# Patient Record
Sex: MALE | Race: WHITE | Employment: FULL TIME | URBAN - METROPOLITAN AREA
[De-identification: names, ages, dates, MRNs, and addresses within clinical notes are randomized per-mention and may not be internally consistent; named-entity substitution may affect disease eponyms.]

---

## 2017-02-13 DIAGNOSIS — F32.A MINOR DEPRESSION: ICD-10-CM

## 2017-02-13 DIAGNOSIS — F41.1 GAD (GENERALIZED ANXIETY DISORDER): ICD-10-CM

## 2017-02-13 RX ORDER — CLONAZEPAM 1 MG/1
1 TABLET ORAL 2 TIMES DAILY PRN
Qty: 60 TABLET | Refills: 0 | Status: SHIPPED
Start: 2017-02-13 | End: 2017-03-28

## 2017-02-13 RX ORDER — BUSPIRONE HYDROCHLORIDE 7.5 MG/1
7.5 TABLET ORAL 2 TIMES DAILY
Qty: 60 TABLET | Refills: 0 | Status: SHIPPED | OUTPATIENT
Start: 2017-02-13 | End: 2017-03-28 | Stop reason: DRUGHIGH

## 2017-02-13 NOTE — TELEPHONE ENCOUNTER
From: Jaycee Jon  To: Nelson Rao PA-C  Sent: 2/13/2017 10:29 AM CST  Subject: Medication Renewal Request    Original authorizing provider: KAMILA Wilkinson would like a refill of the following medications:  BusPIRone H

## 2017-03-24 ENCOUNTER — LAB ENCOUNTER (OUTPATIENT)
Dept: LAB | Age: 47
End: 2017-03-24
Attending: PHYSICIAN ASSISTANT
Payer: COMMERCIAL

## 2017-03-24 DIAGNOSIS — Z13.0 SCREENING, ANEMIA, DEFICIENCY, IRON: ICD-10-CM

## 2017-03-24 DIAGNOSIS — Z13.89 SCREENING FOR BLOOD OR PROTEIN IN URINE: ICD-10-CM

## 2017-03-24 DIAGNOSIS — Z13.220 SCREENING FOR LIPID DISORDERS: ICD-10-CM

## 2017-03-24 DIAGNOSIS — F10.11 ALCOHOL ABUSE, IN REMISSION: ICD-10-CM

## 2017-03-24 DIAGNOSIS — R79.89 ELEVATED LFTS: ICD-10-CM

## 2017-03-24 DIAGNOSIS — Z13.228 SCREENING FOR METABOLIC DISORDER: ICD-10-CM

## 2017-03-24 LAB
ALBUMIN SERPL-MCNC: 4 G/DL (ref 3.5–4.8)
ALP LIVER SERPL-CCNC: 75 U/L (ref 45–117)
ALT SERPL-CCNC: 83 U/L (ref 17–63)
AST SERPL-CCNC: 127 U/L (ref 15–41)
BASOPHILS # BLD AUTO: 0.1 X10(3) UL (ref 0–0.1)
BASOPHILS NFR BLD AUTO: 1.5 %
BILIRUB DIRECT SERPL-MCNC: 0.1 MG/DL (ref 0.1–0.5)
BILIRUB SERPL-MCNC: 0.9 MG/DL (ref 0.1–2)
BUN BLD-MCNC: 16 MG/DL (ref 8–20)
CALCIUM BLD-MCNC: 8.9 MG/DL (ref 8.3–10.3)
CHLORIDE: 105 MMOL/L (ref 101–111)
CHOLEST SMN-MCNC: 169 MG/DL (ref ?–200)
CO2: 25 MMOL/L (ref 22–32)
CREAT BLD-MCNC: 0.93 MG/DL (ref 0.7–1.3)
EOSINOPHIL # BLD AUTO: 0.32 X10(3) UL (ref 0–0.3)
EOSINOPHIL NFR BLD AUTO: 4.8 %
ERYTHROCYTE [DISTWIDTH] IN BLOOD BY AUTOMATED COUNT: 13.2 % (ref 11.5–16)
GAMMA GLUTAMYL TRANSFERASE: 121 U/L (ref 15–85)
GLUCOSE BLD-MCNC: 88 MG/DL (ref 70–99)
HCT VFR BLD AUTO: 45.5 % (ref 37–53)
HDLC SERPL-MCNC: 56 MG/DL (ref 45–?)
HDLC SERPL: 3.02 {RATIO} (ref ?–4.97)
HGB BLD-MCNC: 15.9 G/DL (ref 13–17)
IMMATURE GRANULOCYTE COUNT: 0.09 X10(3) UL (ref 0–1)
IMMATURE GRANULOCYTE RATIO %: 1.3 %
LDLC SERPL CALC-MCNC: 98 MG/DL (ref ?–130)
LYMPHOCYTES # BLD AUTO: 1.57 X10(3) UL (ref 0.9–4)
LYMPHOCYTES NFR BLD AUTO: 23.5 %
M PROTEIN MFR SERPL ELPH: 7.1 G/DL (ref 6.1–8.3)
MCH RBC QN AUTO: 31.6 PG (ref 27–33.2)
MCHC RBC AUTO-ENTMCNC: 34.9 G/DL (ref 31–37)
MCV RBC AUTO: 90.5 FL (ref 80–99)
MONOCYTES # BLD AUTO: 0.9 X10(3) UL (ref 0.1–0.6)
MONOCYTES NFR BLD AUTO: 13.5 %
NEUTROPHIL ABS PRELIM: 3.7 X10 (3) UL (ref 1.3–6.7)
NEUTROPHILS # BLD AUTO: 3.7 X10(3) UL (ref 1.3–6.7)
NEUTROPHILS NFR BLD AUTO: 55.4 %
NONHDLC SERPL-MCNC: 113 MG/DL (ref ?–130)
PLATELET # BLD AUTO: 258 10(3)UL (ref 150–450)
POTASSIUM SERPL-SCNC: 4.5 MMOL/L (ref 3.6–5.1)
RBC # BLD AUTO: 5.03 X10(6)UL (ref 4.3–5.7)
RED CELL DISTRIBUTION WIDTH-SD: 43.9 FL (ref 35.1–46.3)
SODIUM SERPL-SCNC: 138 MMOL/L (ref 136–144)
TRIGLYCERIDES: 77 MG/DL (ref ?–150)
VLDL: 15 MG/DL (ref 5–40)
WBC # BLD AUTO: 6.7 X10(3) UL (ref 4–13)

## 2017-03-24 PROCEDURE — 82977 ASSAY OF GGT: CPT

## 2017-03-24 PROCEDURE — 82248 BILIRUBIN DIRECT: CPT

## 2017-03-24 PROCEDURE — 80053 COMPREHEN METABOLIC PANEL: CPT

## 2017-03-24 PROCEDURE — 80061 LIPID PANEL: CPT

## 2017-03-24 PROCEDURE — 85025 COMPLETE CBC W/AUTO DIFF WBC: CPT

## 2017-03-24 PROCEDURE — 36415 COLL VENOUS BLD VENIPUNCTURE: CPT

## 2017-03-28 ENCOUNTER — OFFICE VISIT (OUTPATIENT)
Dept: INTERNAL MEDICINE CLINIC | Facility: CLINIC | Age: 47
End: 2017-03-28

## 2017-03-28 VITALS
WEIGHT: 206 LBS | SYSTOLIC BLOOD PRESSURE: 100 MMHG | HEIGHT: 69.5 IN | OXYGEN SATURATION: 98 % | DIASTOLIC BLOOD PRESSURE: 80 MMHG | TEMPERATURE: 98 F | BODY MASS INDEX: 29.83 KG/M2 | RESPIRATION RATE: 16 BRPM | HEART RATE: 103 BPM

## 2017-03-28 DIAGNOSIS — R79.89 ELEVATED LFTS: Primary | ICD-10-CM

## 2017-03-28 DIAGNOSIS — F41.1 GAD (GENERALIZED ANXIETY DISORDER): ICD-10-CM

## 2017-03-28 DIAGNOSIS — F10.21 ALCOHOL DEPENDENCE IN REMISSION (HCC): ICD-10-CM

## 2017-03-28 PROCEDURE — 99214 OFFICE O/P EST MOD 30 MIN: CPT | Performed by: PHYSICIAN ASSISTANT

## 2017-03-28 RX ORDER — BUSPIRONE HYDROCHLORIDE 15 MG/1
15 TABLET ORAL 2 TIMES DAILY
Qty: 60 TABLET | Refills: 2 | Status: SHIPPED | OUTPATIENT
Start: 2017-03-28 | End: 2017-03-29

## 2017-03-28 RX ORDER — CLONAZEPAM 1 MG/1
1 TABLET ORAL 2 TIMES DAILY PRN
Qty: 60 TABLET | Refills: 0 | Status: SHIPPED | OUTPATIENT
Start: 2017-03-28 | End: 2017-04-24

## 2017-03-28 NOTE — PROGRESS NOTES
Goldie Nuñez is a 42year old male. HPI:   Pt following up on anxiety. Reports 7.5mg bid of buspar did not improve his anxiety. So far has had adverse effects with SSRI, SNRI therapy. Still taking clonazepam bid.      Reports he has completely stopped d clear  NECK: supple,no adenopathy,no bruits  LUNGS: clear to auscultation  CARDIO: RRR without murmur  GI: good BS's,no masses, HSM or tenderness  EXTREMITIES: no cyanosis, clubbing or edema  PSYCH: mood and affect normal ;thought process logical, memory i

## 2017-03-28 NOTE — PATIENT INSTRUCTIONS
Restart buspirone: take 7.5mg twice daily (1/2 tablet) x 3 days, then 15mg in the morning and 7.5mg in the evening x 3 days, then 15mg twice daily.

## 2017-03-29 NOTE — TELEPHONE ENCOUNTER
From: Adriano Henderson  To: Charla Latham PA-C  Sent: 3/28/2017 4:55 PM CDT  Subject: Non-Urgent Medical Question    Blinda Lyndsay- Can you please send the Busiprone Rx to [address] [facility] on [facility]. in [place]?  The GoodRx price is 1/3 of the price compared to [facility]

## 2017-04-04 ENCOUNTER — HOSPITAL ENCOUNTER (OUTPATIENT)
Dept: ULTRASOUND IMAGING | Age: 47
Discharge: HOME | End: 2017-04-04
Attending: PHYSICIAN ASSISTANT
Payer: COMMERCIAL

## 2017-04-04 DIAGNOSIS — R79.89 ELEVATED LFTS: ICD-10-CM

## 2017-04-04 PROCEDURE — 76700 US EXAM ABDOM COMPLETE: CPT

## 2017-04-24 NOTE — TELEPHONE ENCOUNTER
From: Lluvia Corbin  To: Jason Sheffield PA-C  Sent: 4/24/2017 3:08 PM CDT  Subject: Prescription Question    Good afternoon. I was looking to get a refill for Clonozepam 1MG. Can you send the refill prescription to [facility] on [address] in [facility]?

## 2017-04-26 ENCOUNTER — PATIENT MESSAGE (OUTPATIENT)
Dept: INTERNAL MEDICINE CLINIC | Facility: CLINIC | Age: 47
End: 2017-04-26

## 2017-04-26 NOTE — TELEPHONE ENCOUNTER
From: Sacha Samson  To: Wayne Crawley PA-C  Sent: 4/26/2017 8:32 AM CDT  Subject: Prescription Question    Good morning. I put in a request for a refill on Clonazepan but haven't heard back.  Please send to [address] [facility] on [address] in [facility]

## 2017-06-19 NOTE — PATIENT INSTRUCTIONS
Restart cymbalta take 60mg daily. Will plan to increase to 90mg daily in 3 weeks if tolerating well. Start [place] [address] meetings. Alcoholism: Getting Help  Facing a problem with alcohol can be hard.  Once a person decides to get help, it can be found in many plac Addiction    www.niaaa.nih.gov/alcohol-health  · [employer] on [address] ([place]aron [address]   www.ncadd. org  · Alcoholism Anonymous    www.aa.org  · Substance Abuse and [address] ([address]   www.sama.gov

## 2017-06-19 NOTE — PROGRESS NOTES
Marylene Bass is a 42year old male. HPI:   Anxiety: pt states is not well controlled. Stopped buspar because did not feel it was working. Takes clonazepam bid but admits to taking slightly more in the past month due to uncontrolled symptoms.    Wants to tenderness  EXTREMITIES: no cyanosis, clubbing or edema  PSYCH: appears anxious. No inappropriate speech.  Cognition intact and memory intact  NEURO: a/ox3 gait is stable coordination intact    ASSESSMENT AND PLAN:   Andrez (generalized anxiety disorder)  (yadira

## 2017-08-21 DIAGNOSIS — F41.1 GAD (GENERALIZED ANXIETY DISORDER): ICD-10-CM

## 2017-08-21 RX ORDER — DULOXETIN HYDROCHLORIDE 60 MG/1
60 CAPSULE, DELAYED RELEASE ORAL DAILY
Qty: 30 CAPSULE | Refills: 0 | Status: SHIPPED | OUTPATIENT
Start: 2017-08-21 | End: 2017-10-12

## 2017-08-21 RX ORDER — CLONAZEPAM 1 MG/1
1 TABLET ORAL 2 TIMES DAILY PRN
Qty: 60 TABLET | Refills: 0 | Status: SHIPPED
Start: 2017-08-21 | End: 2017-10-03

## 2017-08-21 NOTE — TELEPHONE ENCOUNTER
Patient stated he can't make it to his appointment today, and would like a refill on Cymbalta and Clonazepam 1 mg. He did not say qty- only what Jonnathan Hebert prescribes. He re-scheduled for next week. Please send RX to [facility] on file.

## 2017-10-03 DIAGNOSIS — F41.1 GAD (GENERALIZED ANXIETY DISORDER): ICD-10-CM

## 2017-10-03 RX ORDER — CLONAZEPAM 1 MG/1
1 TABLET ORAL 2 TIMES DAILY PRN
Qty: 60 TABLET | Refills: 0 | Status: SHIPPED
Start: 2017-10-03 | End: 2017-11-01

## 2017-10-10 NOTE — PROGRESS NOTES
Ashleigh Alejandro is a 42year old male. HPI:   Patient reports x 2 weeks he has been going to Alcoholics Anonymous. He had his last drink 10/3 and has been feeling gradually better every day.  He reports feeling significantly improved since quitting drinking developed, well nourished,in no apparent distress  SKIN: no rashes,no suspicious lesions, warm and dry  HEENT: atraumatic, normocephalic,ears and throat are clear  NECK: supple,no adenopathy, no thyromegaly  LUNGS: clear to auscultation b/l no W/R/R  CARDI

## 2017-10-11 NOTE — PATIENT INSTRUCTIONS
Continue current medications and continue with AA meetings. Let us know if you have any concerns including relapse in drinking. Complete blood test when possible, fasting 4 hours beforehand  Increase cymbalta to total of 90mg daily.    Continue clonazepam Alcoholism can lead to a type of distorted thinking known as “alcohol-think.” One of the most common forms of this is denial. This is when the person denies that drinking is a problem.  He or she may deny that any of the problems in his or her life are caus

## 2017-10-12 DIAGNOSIS — F41.1 GAD (GENERALIZED ANXIETY DISORDER): ICD-10-CM

## 2017-10-12 RX ORDER — DULOXETIN HYDROCHLORIDE 60 MG/1
CAPSULE, DELAYED RELEASE ORAL
Qty: 30 CAPSULE | Refills: 2 | Status: SHIPPED | OUTPATIENT
Start: 2017-10-12 | End: 2018-01-02

## 2017-11-01 DIAGNOSIS — F41.1 GAD (GENERALIZED ANXIETY DISORDER): ICD-10-CM

## 2017-11-01 RX ORDER — CLONAZEPAM 1 MG/1
1 TABLET ORAL 2 TIMES DAILY PRN
Qty: 60 TABLET | Refills: 1 | Status: ON HOLD
Start: 2017-11-01 | End: 2017-12-08

## 2017-11-01 NOTE — TELEPHONE ENCOUNTER
From: Lluvia Corbin  Sent: 11/1/2017 11:08 AM CDT  Subject: Medication Renewal Request    Lluvia Corbin would like a refill of the following medications:     ClonazePAM 1 MG Oral Tab Svetlana Pineda MD]    Preferred pharmacy: [id number]anesh [facility]

## 2017-12-06 PROBLEM — F10.939 ALCOHOL WITHDRAWAL SYNDROME WITH COMPLICATION (HCC): Status: ACTIVE | Noted: 2017-12-06

## 2017-12-06 PROBLEM — F10.239 ALCOHOL WITHDRAWAL SYNDROME WITH COMPLICATION (HCC): Status: ACTIVE | Noted: 2017-12-06

## 2017-12-06 PROBLEM — R74.8 ELEVATED LIVER ENZYMES: Status: ACTIVE | Noted: 2017-12-06

## 2017-12-06 NOTE — ED INITIAL ASSESSMENT (HPI)
Pt c/o etoh and alcohol withdrawal symptoms, pt c/o shakes,pt last drink was 6 hours ago, pt sts he normally drinks 2 \"big bottles of wine,\" pt denies SI/HI.

## 2017-12-06 NOTE — ED PROVIDER NOTES
Patient Seen in: [facility] Emergency Department    History   Patient presents with:  Alcohol Intoxication (neurologic)    Stated Complaint: etoh    HPI    This is a 57-year-old male who arrives here requesting alcohol detox.   The patient states he There is no rebound. There is no guarding. EXT: There is good pulses bilaterally. There is no calf tenderness. There is no rash noted. There is no edema his hands are somewhat tremulous. NEURO: Alert and oriented x3.   Muscle strength and sensory shakes are gone down significantly feels comfortable at this present time. The patient is not in any respiratory distress.   The patient's elevated liver functions were noted to be elevated I discussed this case with Dr. the lochia who felt that is all pro

## 2017-12-07 ENCOUNTER — APPOINTMENT (OUTPATIENT)
Dept: ULTRASOUND IMAGING | Facility: HOSPITAL | Age: 47
DRG: 897 | End: 2017-12-07
Attending: HOSPITALIST
Payer: COMMERCIAL

## 2017-12-07 PROCEDURE — 76700 US EXAM ABDOM COMPLETE: CPT | Performed by: HOSPITALIST

## 2017-12-07 NOTE — PAYOR COMM NOTE
VS--------------  ADMISSION REVIEW       12/6       Patient presents with:  Alcohol Intoxication      Stated Complaint: etoh     HPI     This is a 53-year-old male who arrives here requesting alcohol detox.   The patient states he is not suicidal, homicidal normal limits   CBC W/ DIFFERENTIAL - Abnormal; Notable for the following:      HGB 17.6 (*)       Monocyte Absolute 0.76 (*)       All other components within normal limits   CBC WITH DIFFERENTIAL WITH PLATELET     Narrative:      The following orders wer

## 2017-12-07 NOTE — ED NOTES
Information faxed to [address] for bed request for patient.   Instructed to wait 1 hour and with no answer to call [phone]

## 2017-12-07 NOTE — ED NOTES
Bedside report given to Tony Barton along the bedside verbal order to give 1mg of ativan along with zofran and a liter of fluids

## 2017-12-07 NOTE — PROGRESS NOTES
12/07/17 4715   Clinical Encounter Type   Visited With Patient  (Responded to consult.)   Continue Visiting No  ( remains available at pager #[phone] as needed/requested.)   Patient's Supportive Strategies/Resources  assisted patient to telly

## 2017-12-07 NOTE — ED NOTES
I called [facility] patient is out of network.  The family members and his only family his 2 aunts express

## 2017-12-07 NOTE — PROGRESS NOTES
NURSING ADMISSION NOTE      Patient admitted via Cart  Oriented to room. Safety precautions initiated. Bed in low position.   Call light in reach.    -------------------------------------------  Admission navigator completed  Patient is A&Ox4  [facility]

## 2017-12-07 NOTE — CONSULTS
[facility]                       Gastroenterology Consultation-[facility] Gastroenterology    Dhara Ape Patient Status:  Observation    1970 MRN [id number]   [facility] 3NE-A Attending Jaida Arceo MD   Hosp Day # 0 CAMERON Noble injection 2 mg 2 mg Intravenous Q8H PRN   metoprolol Tartrate (LOPRESSOR) tab 25 mg 25 mg Oral BID PRN   Metoclopramide HCl (REGLAN) injection 10 mg 10 mg Intravenous Q8H PRN   [COMPLETED] Thiamine HCl 100 mg in sodium chloride 0.9 % 50 mL IVPB 100 mg Intr The patient has no family history of colon cancer or other gastrointestinal malignancies; No family history of ulcer disease, or inflammatory bowel disease  ROS:  In addition to the pertinent positives described above:             Infectious Disease:  No c tympany to percussion  Ext: No peripheral edema or cyanosis  Skin: Warm and dry; flushed   Psychiatric: Appropriate mood and congruent affect without obvious depression or anxiety  Neuro: + tremors, no asterixis   Labs:   Lab Results  Component Value Date common bile duct. Common bile duct diameter is for mm. PANCREAS:  Normal.  SPLEEN:  Normal.  KIDNEYS:  Lobulated renal contours nonspecific but may relate to scarring. Right kidney measures 10.1 cm. Left kidney measures 11.4 cm.   AORTA/IVC:  Normal.  = molly.   Abner Perez MD

## 2017-12-07 NOTE — ED NOTES
PCT 3 CTU WITH REPORT GIVEN TO AND ACCEPTED BY PRECIOUS MISTRY  PREPARED FOR TX TO 3612 PER CART VIA TRANSORT

## 2017-12-07 NOTE — H&P
ART HOSPITALIST  History and Physical     [facility] Patient Status:  Observation    1970 MRN [id number]   [facility] 3NE-A Attending Jodi Walter, [address] Day # 0 PCP Emelia Espinoza MD     Chief Complaint: alcohol withdrawa Exam:    BP (!) 156/103 (BP Location: Left arm)   Pulse 119   Temp 98.6 °F (37 °C) (Oral)   Resp 14   Ht 5' 10\" (1.778 m)   Wt 190 lb (86.2 kg)   SpO2 96%   BMI 27.26 kg/m²   General: Alert and oriented x 3. HEENT: Normocephalic atraumatic.  Moist mucous

## 2017-12-07 NOTE — PROGRESS NOTES
Patient seen and examined this am .   He feels anxious. No SI/HI. Will cont CIWA protocol. FOR FURTHER DETAILS PLEASE Refere to my colleagues H&P from this morning.      Yola Choudhury MD  [facility]

## 2017-12-07 NOTE — BH LEVEL OF CARE ASSESSMENT
Level of Care Assessment Note               General Questions  Why are you here?: Patient states to detox  Precipitating Events: etoh intake  History of Present Illness: Pt admits to drinking wine 2 large bottles per day.   He states he has been going to [facility] observed  Sleep Pattern: Sleeps all night  Appetite Symptoms: Decreased  Unplanned Weight Loss: Yes (comment)  Unplanned Weight Gain: No  History of Eating Disorder: No  Active Eating Disorder: No                 Current/Previous MH/CD Providers  Hospital Characteristics: Normal rate;Normal rhythm;Normal volume  Concentration: Unimpaired  Memory: Recent memory intact; Remote memory intact  Orientation Level: Oriented X4  Thought Characteristics: Alert  Judgment: Fair  Insight: Fair    Assessment Summary  Ass

## 2017-12-08 NOTE — PROGRESS NOTES
Gastroenterology Progress Note  Patient Name: Dhara Caro  Chief Complaint: EtOH hepatitis, withdrawals  S: Feeling much better today. Had some Ativan last night, hasn't required anything this morning. Slept well, no hallucinations, no other symptoms.

## 2017-12-08 NOTE — PROGRESS NOTES
[facility] [address]  [place], [address]  ?  12/08/17  ? Re: Christina Crowder  ? To Whom It May Concern:    Christina Crowder was admitted to [facility] from 12/6/2017 to 12/08/17.     Please excuse Christina Crowder from attending work

## 2017-12-08 NOTE — DISCHARGE SUMMARY
[facility] PSYCHIATR[facility] [place] HOSPITALIST  DISCHARGE SUMMARY     Becky Bowdendulce maria Patient Status:  Inpatient    1970 MRN [id number]   [facility] 3NE-A Attending Ricky Atkinson MD   Hosp Day # 1 PCP Sandi Banda MD     Date of Admission: 2017  Date of Dis recommendations (brief descriptions):  • As above    Lab/Test results pending at Discharge:   · none    Consultants:  • GI, psychiatry     Discharge Medication List:     Discharge Medications      CONTINUE taking these medications      Instructions Prescri

## 2017-12-08 NOTE — PROGRESS NOTES
NURSING DISCHARGE NOTE    Discharged Home via Wheelchair. Accompanied by Support staff  Belongings Taken by patient/family. AVS and follow up given and reviewed with patient, verbalized understanding.

## 2017-12-11 ENCOUNTER — PATIENT OUTREACH (OUTPATIENT)
Dept: CASE MANAGEMENT | Age: 47
End: 2017-12-11

## 2017-12-11 DIAGNOSIS — Z02.9 ENCOUNTERS FOR ADMINISTRATIVE PURPOSE: ICD-10-CM

## 2017-12-19 NOTE — PATIENT INSTRUCTIONS
Schedule follow-up with Dr. Vaughn Matt current medications , take propranolol twice a day as needed for anxiety  Recommend establishing with a psychologist

## 2017-12-21 NOTE — PROGRESS NOTES
Marco Duque is a 41year old male. HPI:   Pt was hospitalized 12/6-12/8 for alcohol withdrawal and elevated LFTs. Hospital course on discharge summary as follows:  Brief Synopsis:      Patient admitted and placed on IVF, CIWA protocol.  He has been do Alcohol use: Yes           4.2 - 4.8 oz/week     Standard drinks or equivalent: 7 - 8 per week     Comment: Quit 10/3/17       REVIEW OF SYSTEMS:   GENERAL HEALTH: feels well otherwise.  Denies fever, chills, unintentional weight change Propranolol HCl 40 MG Oral Tab 60 tablet 1      Sig: Take 1 tablet (40 mg total) by mouth 2 (two) times daily as needed (anxiety).       ClonazePAM 1 MG Oral Tab 60 tablet 1      Sig: Take 1 tablet (1 mg total) by mouth 2 (two) times daily as needed for Anx

## 2017-12-22 NOTE — PROGRESS NOTES
Multiple attempts to reach the pt and messages left with no returned phone call. Pt went to [place] on 12/19/17. Closing encounter.

## 2018-01-02 DIAGNOSIS — F41.1 GAD (GENERALIZED ANXIETY DISORDER): ICD-10-CM

## 2018-01-02 DIAGNOSIS — F32.A MINOR DEPRESSION: ICD-10-CM

## 2018-01-02 RX ORDER — DULOXETIN HYDROCHLORIDE 60 MG/1
60 CAPSULE, DELAYED RELEASE ORAL DAILY
Qty: 30 CAPSULE | Refills: 0 | Status: SHIPPED | OUTPATIENT
Start: 2018-01-02 | End: 2018-01-31

## 2018-01-02 RX ORDER — DULOXETIN HYDROCHLORIDE 30 MG/1
30 CAPSULE, DELAYED RELEASE ORAL DAILY
Qty: 30 CAPSULE | Refills: 0 | Status: SHIPPED | OUTPATIENT
Start: 2018-01-02 | End: 2018-01-31

## 2018-01-22 NOTE — PATIENT INSTRUCTIONS
Continue current medications   Have blood test drawn, fasting 4 hours prior, for repeat of liver enzymes  Continue abstinence from alcohol and call or send us a message with any questions or concerns    What can I do to improve my insomnia? — You can follo sleep and can help with other worries, too. Can I use alcohol to help me sleep? — No, do not use alcohol as a sleep aid. Even though alcohol makes you sleepy at first, it disrupts sleep later in the night.        All topics are updated as new evidence beco

## 2018-01-22 NOTE — PROGRESS NOTES
Sofiya Marte is a 41year old male.   HPI:   Pt following up on anxiety, depression, alcohol dependence in remission, elevated LFT's  States anxiety symptoms are very well controlled  Is 6 weeks sober, feels good, going to AA meetings 7-8 times per week cough or wheezing  CARDIOVASCULAR: denies chest pain or palpitations, denies leg swelling  GI: denies abdominal pain and denies heartburn.  Denies nausea, vomiting, diarrhea, constipation  NEURO: denies headaches, dizziness, weakness, syncope    EXAM:   BP

## 2018-01-31 DIAGNOSIS — F41.1 GAD (GENERALIZED ANXIETY DISORDER): ICD-10-CM

## 2018-01-31 DIAGNOSIS — F32.A MINOR DEPRESSION: ICD-10-CM

## 2018-02-02 RX ORDER — DULOXETIN HYDROCHLORIDE 30 MG/1
CAPSULE, DELAYED RELEASE ORAL
Qty: 90 CAPSULE | Refills: 0 | Status: SHIPPED | OUTPATIENT
Start: 2018-02-02 | End: 2018-05-04

## 2018-02-02 RX ORDER — DULOXETIN HYDROCHLORIDE 60 MG/1
CAPSULE, DELAYED RELEASE ORAL
Qty: 90 CAPSULE | Refills: 0 | Status: SHIPPED | OUTPATIENT
Start: 2018-02-02 | End: 2018-05-04

## 2018-02-03 DIAGNOSIS — F41.1 GAD (GENERALIZED ANXIETY DISORDER): ICD-10-CM

## 2018-02-03 DIAGNOSIS — F32.A MINOR DEPRESSION: ICD-10-CM

## 2018-02-05 RX ORDER — DULOXETIN HYDROCHLORIDE 60 MG/1
CAPSULE, DELAYED RELEASE ORAL
Qty: 30 CAPSULE | Refills: 0 | OUTPATIENT
Start: 2018-02-05

## 2018-02-05 RX ORDER — DULOXETIN HYDROCHLORIDE 30 MG/1
CAPSULE, DELAYED RELEASE ORAL
Qty: 30 CAPSULE | Refills: 0 | OUTPATIENT
Start: 2018-02-05

## 2018-03-01 DIAGNOSIS — F41.1 GAD (GENERALIZED ANXIETY DISORDER): ICD-10-CM

## 2018-03-01 NOTE — TELEPHONE ENCOUNTER
From: Dhara Caro  Sent: 3/1/2018 2:45 PM CST  Subject: Medication Renewal Request    Dhara Caro would like a refill of the following medications:     ClonazePAM 1 MG Oral Tab Chuck Monsalve PA-C]    Preferred pharmacy: [id number] -

## 2018-03-02 RX ORDER — CLONAZEPAM 1 MG/1
1 TABLET ORAL 2 TIMES DAILY PRN
Qty: 60 TABLET | Refills: 1 | Status: SHIPPED
Start: 2018-03-02 | End: 2018-04-23

## 2018-03-06 DIAGNOSIS — F41.1 GAD (GENERALIZED ANXIETY DISORDER): ICD-10-CM

## 2018-03-06 DIAGNOSIS — F32.A MINOR DEPRESSION: ICD-10-CM

## 2018-03-06 RX ORDER — DULOXETIN HYDROCHLORIDE 60 MG/1
CAPSULE, DELAYED RELEASE ORAL
Qty: 90 CAPSULE | Refills: 0 | OUTPATIENT
Start: 2018-03-06

## 2018-03-06 RX ORDER — DULOXETIN HYDROCHLORIDE 30 MG/1
CAPSULE, DELAYED RELEASE ORAL
Qty: 90 CAPSULE | Refills: 0 | OUTPATIENT
Start: 2018-03-06

## 2018-04-23 NOTE — PATIENT INSTRUCTIONS
Try 1/2 tablet of ambien at night for sleep.  If this is not effective, increase to 1 tablet    Switch cymbalta to nighttime dosing: take 60mg tonight, then next dose 90mg tomorrow night    Continue clonazepam twice daily as needed    Have blood test drawn: antidepressant rather than a sleep aid. Antidepressants often improve sleep and can help with other worries, too. All topics are updated as new evidence becomes available and our peer review process is complete.    This topic retrieved from UpToDate on

## 2018-04-23 NOTE — PROGRESS NOTES
Amelia Samayoa is a 41year old male. HPI:   Pt following up on anxiety, depression, alcohol dependence in remission, elevated LFT's  States anxiety is fairly controlled, but c/o significant insomnia. Not better with clonazepam at night.    Is 6 > 4 mos so REVIEW OF SYSTEMS:   GENERAL HEALTH: feels well otherwise.  Denies fever, chills, unintentional weight change  SKIN: denies any unusual skin lesions or rashes  RESPIRATORY: denies shortness of breath with exertion, denies cough or wheezing  CARDIOVASCUL for Sleep. ClonazePAM 1 MG Oral Tab 60 tablet 2      Sig: Take 1 tablet (1 mg total) by mouth 2 (two) times daily as needed for Anxiety. The patient indicates understanding of these issues and agrees to the plan.   The patient is asked to r

## 2018-04-24 ENCOUNTER — TELEPHONE (OUTPATIENT)
Dept: INTERNAL MEDICINE CLINIC | Facility: CLINIC | Age: 48
End: 2018-04-24

## 2018-04-24 NOTE — TELEPHONE ENCOUNTER
Patient called and requested an early refill for ClonazePAM 1 MG Oral Tab. Please call patient, and ok to LM once done. His pharmacy is [facility].

## 2018-04-24 NOTE — TELEPHONE ENCOUNTER
Rx sent to [facility] yesterday with note stating it was okay to refill early per  [facility]. Spoke with [facility] and advised above. They will call the patient when Rx is ready for .

## 2018-05-04 DIAGNOSIS — F41.1 GAD (GENERALIZED ANXIETY DISORDER): ICD-10-CM

## 2018-05-04 DIAGNOSIS — F32.A MINOR DEPRESSION: ICD-10-CM

## 2018-05-04 RX ORDER — DULOXETIN HYDROCHLORIDE 30 MG/1
CAPSULE, DELAYED RELEASE ORAL
Qty: 90 CAPSULE | Refills: 0 | Status: SHIPPED | OUTPATIENT
Start: 2018-05-04 | End: 2018-08-13

## 2018-05-04 RX ORDER — DULOXETIN HYDROCHLORIDE 60 MG/1
CAPSULE, DELAYED RELEASE ORAL
Qty: 90 CAPSULE | Refills: 0 | Status: SHIPPED | OUTPATIENT
Start: 2018-05-04 | End: 2018-08-13

## 2018-05-08 DIAGNOSIS — F32.A MINOR DEPRESSION: ICD-10-CM

## 2018-05-08 DIAGNOSIS — F41.1 GAD (GENERALIZED ANXIETY DISORDER): ICD-10-CM

## 2018-05-08 RX ORDER — DULOXETIN HYDROCHLORIDE 60 MG/1
CAPSULE, DELAYED RELEASE ORAL
Qty: 90 CAPSULE | Refills: 0 | Status: SHIPPED | OUTPATIENT
Start: 2018-05-08 | End: 2018-08-10

## 2018-05-08 RX ORDER — DULOXETIN HYDROCHLORIDE 30 MG/1
CAPSULE, DELAYED RELEASE ORAL
Qty: 90 CAPSULE | Refills: 0 | Status: SHIPPED | OUTPATIENT
Start: 2018-05-08 | End: 2018-08-10

## 2018-07-18 DIAGNOSIS — F41.1 GAD (GENERALIZED ANXIETY DISORDER): ICD-10-CM

## 2018-07-18 RX ORDER — CLONAZEPAM 1 MG/1
TABLET ORAL
Qty: 60 TABLET | Refills: 1 | Status: SHIPPED
Start: 2018-07-18 | End: 2018-09-11

## 2018-08-03 ENCOUNTER — APPOINTMENT (OUTPATIENT)
Dept: LAB | Age: 48
End: 2018-08-03
Attending: PHYSICIAN ASSISTANT
Payer: COMMERCIAL

## 2018-08-03 DIAGNOSIS — R74.8 ELEVATED LIVER ENZYMES: ICD-10-CM

## 2018-08-03 LAB
ALBUMIN SERPL-MCNC: 3.1 G/DL (ref 3.5–4.8)
ALBUMIN/GLOB SERPL: 1.2 {RATIO} (ref 1–2)
ALP LIVER SERPL-CCNC: 72 U/L (ref 45–117)
ALT SERPL-CCNC: 27 U/L (ref 17–63)
ANION GAP SERPL CALC-SCNC: 7 MMOL/L (ref 0–18)
AST SERPL-CCNC: 28 U/L (ref 15–41)
BILIRUB SERPL-MCNC: 0.8 MG/DL (ref 0.1–2)
BUN BLD-MCNC: 11 MG/DL (ref 8–20)
BUN/CREAT SERPL: 12.8 (ref 10–20)
CALCIUM BLD-MCNC: 8.6 MG/DL (ref 8.3–10.3)
CHLORIDE SERPL-SCNC: 105 MMOL/L (ref 101–111)
CO2 SERPL-SCNC: 27 MMOL/L (ref 22–32)
CREAT BLD-MCNC: 0.86 MG/DL (ref 0.7–1.3)
GLOBULIN PLAS-MCNC: 2.6 G/DL (ref 2.5–3.7)
GLUCOSE BLD-MCNC: 72 MG/DL (ref 70–99)
M PROTEIN MFR SERPL ELPH: 5.7 G/DL (ref 6.1–8.3)
OSMOLALITY SERPL CALC.SUM OF ELEC: 286 MOSM/KG (ref 275–295)
POTASSIUM SERPL-SCNC: 4.3 MMOL/L (ref 3.6–5.1)
SODIUM SERPL-SCNC: 139 MMOL/L (ref 136–144)

## 2018-08-03 PROCEDURE — 80053 COMPREHEN METABOLIC PANEL: CPT

## 2018-08-03 PROCEDURE — 36415 COLL VENOUS BLD VENIPUNCTURE: CPT

## 2018-08-09 DIAGNOSIS — F32.A MINOR DEPRESSION: ICD-10-CM

## 2018-08-09 DIAGNOSIS — F41.1 GAD (GENERALIZED ANXIETY DISORDER): ICD-10-CM

## 2018-08-10 RX ORDER — DULOXETIN HYDROCHLORIDE 30 MG/1
CAPSULE, DELAYED RELEASE ORAL
Qty: 30 CAPSULE | Refills: 0 | Status: SHIPPED | OUTPATIENT
Start: 2018-08-10 | End: 2018-08-13

## 2018-08-10 RX ORDER — DULOXETIN HYDROCHLORIDE 60 MG/1
CAPSULE, DELAYED RELEASE ORAL
Qty: 30 CAPSULE | Refills: 0 | Status: SHIPPED | OUTPATIENT
Start: 2018-08-10 | End: 2018-08-13

## 2018-08-13 NOTE — PROGRESS NOTES
Marylene Bass is a 41year old male. HPI:   Pt here for f/u on depression, anxiety, hx alcoholism in remission. Now 8 months sober, still attending AA, doing well. LFT's returned to normal with recent CMP    Anxiety: still feels is poorly controlled.  Has with exertion, denies cough or wheezing  CARDIOVASCULAR: denies chest pain or palpitations, denies leg swelling  GI: denies abdominal pain and denies heartburn.  Denies nausea, vomiting, diarrhea, constipation  NEURO: denies headaches, dizziness, weakness, 60 MG Oral Cap DR Particles 90 capsule 0      Sig: TAKE 1 CAPSULE BY MOUTH ONE TIME A DAY ALONG WITH 30 MG CAPSULE FOR A TOTAL OF 90 MG             The patient indicates understanding of these issues and agrees to the plan.   The patient is asked to return

## 2018-08-16 PROBLEM — F10.939 ALCOHOL WITHDRAWAL SYNDROME WITH COMPLICATION (HCC): Status: RESOLVED | Noted: 2017-12-06 | Resolved: 2018-08-16

## 2018-08-16 PROBLEM — F10.239 ALCOHOL WITHDRAWAL SYNDROME WITH COMPLICATION (HCC): Status: RESOLVED | Noted: 2017-12-06 | Resolved: 2018-08-16

## 2018-08-16 NOTE — PATIENT INSTRUCTIONS
Continue current medications and healthy lifestyle  Establish with psychologist  Start buspar.  Take twice daily for 1 week, then increase to 3 times daily

## 2018-09-11 DIAGNOSIS — F41.1 GAD (GENERALIZED ANXIETY DISORDER): ICD-10-CM

## 2018-09-11 RX ORDER — CLONAZEPAM 1 MG/1
TABLET ORAL
Qty: 60 TABLET | Refills: 1 | Status: SHIPPED
Start: 2018-09-11 | End: 2018-10-29

## 2018-10-22 ENCOUNTER — MED REC SCAN ONLY (OUTPATIENT)
Dept: INTERNAL MEDICINE CLINIC | Facility: CLINIC | Age: 48
End: 2018-10-22

## 2018-10-22 PROBLEM — R74.8 ELEVATED LIVER ENZYMES: Status: RESOLVED | Noted: 2017-12-06 | Resolved: 2018-10-22

## 2018-10-22 PROBLEM — E88.09 HYPOALBUMINEMIA: Status: ACTIVE | Noted: 2018-10-22

## 2018-10-22 NOTE — PROGRESS NOTES
Cherelle Casey is a 41year old male. HPI:   Pt following up on TIFFANIE, hx alcohol dependence in remission. Still attending AA 4 times per week, feels great benefit and support from this. Reports stable on buspar 10mg tid, cymbalta 90mg daily.  Symptoms sli No       REVIEW OF SYSTEMS:   GENERAL HEALTH: feels well otherwise.  Denies fever, chills, unintentional weight change  SKIN: denies any unusual skin lesions or rashes  RESPIRATORY: denies shortness of breath with exertion, denies cough or wheezing  CARDIOV

## 2018-10-29 ENCOUNTER — PATIENT MESSAGE (OUTPATIENT)
Dept: INTERNAL MEDICINE CLINIC | Facility: CLINIC | Age: 48
End: 2018-10-29

## 2018-10-29 DIAGNOSIS — F41.1 GAD (GENERALIZED ANXIETY DISORDER): ICD-10-CM

## 2018-10-29 RX ORDER — CLONAZEPAM 1 MG/1
TABLET ORAL
Qty: 60 TABLET | Refills: 0 | OUTPATIENT
Start: 2018-10-29 | End: 2019-03-20

## 2018-10-29 NOTE — PROGRESS NOTES
OK per Verónica Campos to refill early on 11/1/18 and future refills need to come from psychiatry that pt needs to establish with.

## 2018-10-29 NOTE — TELEPHONE ENCOUNTER
From: Lb Livingston  To: Lisa Tavera PA-C  Sent: 10/29/2018 1:02 PM CDT  Subject: Prescription Question    Enzo Livings- Good morning.  Is it possible to get an early refill for my Clonazepam? My refill date isn't until 11/8/18 and I'm running low and isa

## 2019-03-20 NOTE — PATIENT INSTRUCTIONS
Continue current medications  Continue to try to wean down on clonazepam by taking 1/2 tablet instead of a full tablet when possible.  Goal for next visit is to be taking 1/2 tablet more often than a full tablet

## 2019-03-20 NOTE — PROGRESS NOTES
Marimar Kim is a 57year old male. HPI:   Pt here for anxiety f/u.  Was seeing psychiatrist and meds adjusted, but did not feel comfortable there  cymbalta and buspar were changed to fluvoxamine and patient feels he has been tolerating this well; denies 182 lb   BMI 26.88 kg/m²   GENERAL: well developed, well nourished,in no apparent distress  SKIN: no rashes,no suspicious lesions, warm and dry  HEENT: atraumatic, normocephalic,ears and throat are clear  NECK: supple,no adenopathy, no thyromegaly  LUNGS:

## 2019-04-08 ENCOUNTER — MED REC SCAN ONLY (OUTPATIENT)
Dept: INTERNAL MEDICINE CLINIC | Facility: CLINIC | Age: 49
End: 2019-04-08

## 2019-05-06 NOTE — TELEPHONE ENCOUNTER
Patient states he has been taking 2-3 tablets of Clonazepam 1mg  the last week. States he had been drinking for 2 weeks and today is his 3rd day being sober. Reports anxiety symptoms are worsening and he can not fill his script for another 10 days.      Braden Henderson

## 2019-05-06 NOTE — TELEPHONE ENCOUNTER
Patient says he is experiencing severe anxiety and would like to speak with Gisela Jose, if possible. I did advise the patient that it might be a nurse calling back.

## 2019-05-08 NOTE — TELEPHONE ENCOUNTER
Vicente Barnes, [address] [place] [address], [place] [address], RN; Isis Delgadillo PA-C             Hi Tiffany Blanco and [facility] ,     I received your navigation order for behavioral health services.  I have reached out to your patient and left a message with my contact informatio

## 2019-05-20 ENCOUNTER — APPOINTMENT (OUTPATIENT)
Dept: LAB | Age: 49
End: 2019-05-20
Attending: PHYSICIAN ASSISTANT
Payer: COMMERCIAL

## 2019-05-20 DIAGNOSIS — F41.1 GAD (GENERALIZED ANXIETY DISORDER): ICD-10-CM

## 2019-05-20 PROCEDURE — 36415 COLL VENOUS BLD VENIPUNCTURE: CPT

## 2019-05-20 PROCEDURE — 80053 COMPREHEN METABOLIC PANEL: CPT

## 2019-05-22 NOTE — PROGRESS NOTES
Nirav Milner is a 57year old male. HPI:   Pt following up on anxiety. Since last visit admits to relapse in alcoholism, was drinking for about 2 weeks but only 2 days of heavy drinking. Since then he is sober > 2 weeks.  Denies withdrawal symptoms     I Wt 185 lb   BMI 27.32 kg/m²   GENERAL: well developed, well nourished,in no apparent distress  SKIN: no rashes,no suspicious lesions, warm and dry   LUNGS: clear to auscultation b/l   CARDIO: RRR    EXTREMITIES: no cyanosis, clubbing or edema  NEURO: a/ox3

## 2019-07-09 DIAGNOSIS — F41.1 GAD (GENERALIZED ANXIETY DISORDER): ICD-10-CM

## 2019-07-09 RX ORDER — CLONAZEPAM 1 MG/1
TABLET ORAL
Qty: 60 TABLET | Refills: 0 | Status: SHIPPED
Start: 2019-07-09 | End: 2019-08-21

## 2019-07-09 NOTE — TELEPHONE ENCOUNTER
Patient called and requested a refill on   clonazePAM 1 MG Oral Tab 60 tablet     Qty 90. Please [facility] to [facility].

## 2019-08-21 NOTE — PROGRESS NOTES
Sravan Treviño is a 57year old male. HPI:   Pt here for med check on fluvoxamine, clonazepam, trazodone. States he is doing well, still attending [place] [address] meetings, no relapse since last visit. Denies side effects on medication.  Has been able to take 1/2 tab o 124/86   Pulse 99   Temp 97.9 °F (36.6 °C) (Oral)   Resp 16   Ht 69\"   Wt 196 lb   BMI 28.94 kg/m²   GENERAL: well developed, well nourished,in no apparent distress  SKIN: no rashes,no suspicious lesions, warm and dry  HEENT: atraumatic, normocephalic,ear

## 2019-08-21 NOTE — PATIENT INSTRUCTIONS
Continue current medications   Consider the following for posture at your desk:  Check how you sit  When you sit properly, pressure on your back is reduced. Try these steps:  · Sit so that the curve of your lower back fits easily against the chair.  Keep yo

## 2019-10-20 PROBLEM — F10.20 ALCOHOL DEPENDENCE (HCC): Status: ACTIVE | Noted: 2019-10-20

## 2019-10-20 NOTE — ED NOTES
Report called to [facility] RN at this time at [facility]. Will arrange Jemma Anson ambulance transport for patient. Report given to [facility] in ED as well. EMTALA and PCS forms completed. Belongings obtained to send with ems when here.

## 2019-10-20 NOTE — ED NOTES
Patient continues to rest comfortably on cart. Aunt sitting at bedside. No distress observed. Nausea and anxiety greatly improved and controlled at this time. Waiting for [facility] assessment.

## 2019-10-20 NOTE — ED PROVIDER NOTES
Patient Seen in: [facility] Emergency Department      History   Patient presents with:  Eval-P (psychiatric)    Stated Complaint: requesting detox    HPI  Patient is a 61-year-old male who has history of alcohol abuse.   Patient states he has been  complaint: requesting detox  Other systems are as noted in HPI. Constitutional and vital signs reviewed. All other systems reviewed and negative except as noted above.     Physical Exam     ED Triage Vitals [10/20/19 9336]   BP (!) 150/115   Pulse 120 components:    Cannabinoid Urine Presumed Positive (*)     Ethyl Alcohol Urine, Qualitative Positive (*)     All other components within normal limits    Narrative:     Results of the Urine Drug Screen should be used only for medical purposes.    CBC W/ DIF

## 2019-10-20 NOTE — ED NOTES
Patient provided with additional warm blankets. No distress observed. Resting calmly on cart. Lights dimmed. Waiting for [facility] assessment for detox.
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
## 2019-10-20 NOTE — ED INITIAL ASSESSMENT (HPI)
Patient requesting alcohol detox treatment, reports last drink 2 hours ago. States he has been drinking for 7 days straight. Reports last completed detox program about 2 years ago. Reports nausea, feeling shaky. Denies any hx of seizures.

## 2019-10-20 NOTE — ED NOTES
Patient reports nausea slightly increased, anxiety decreased. Just reports \"overall feeling terrible\". Resting comfortably on cart. No vomiting since arrival to ED. Waiting for update from [facility].

## 2019-10-20 NOTE — ED NOTES
O2 sat observed to be 90% RA, 2L nc O2 placed on patient. Reports he is feeling better, nausea and anxiety greatly decreased. No distress observed.

## 2019-10-22 NOTE — H&P
Dhara Mason    PATIENT'S NAME: Baby [place]   ATTENDING PHYSICIAN: Candido Mcbride M.D.    PATIENT ACCOUNT #: [de-identified]     MEDICAL RECORD #: [id number] YOB: 1970   ADMISSION DATE: 10/20/2019        MADDY THIBODEAUX HISTORY AND PHYSICAL EX pallor. Congested conjunctivae. Vision is good. Nose is clear. Nares are patent. Mild polyposis. No oral lesions. Oral mucosa is moist.  Dentition is fair. Deglutition is normal.  Hearing is good. Ears are clear. NECK:  Supple.   No JVD or lymph n above findings. Continue with the present management, watch for signs of withdrawal, and treat accordingly. Patient counseled regarding refraining from drinking and smoking. Explained the long-term effects of these and the need to refrain.   Anthony johnson

## 2019-11-12 DIAGNOSIS — F32.A DEPRESSION, UNSPECIFIED DEPRESSION TYPE: Primary | ICD-10-CM

## 2019-11-12 RX ORDER — FLUVOXAMINE MALEATE 100 MG
TABLET ORAL
Qty: 180 TABLET | Refills: 0 | Status: SHIPPED | OUTPATIENT
Start: 2019-11-12 | End: 2020-01-15

## 2019-11-12 NOTE — TELEPHONE ENCOUNTER
Refill req:     fluvoxaMINE Maleate 100 MG Oral Tab  Pt is out as of today    Please send to [facility] on file

## 2020-01-15 NOTE — PATIENT INSTRUCTIONS
Continue current medications but start increased dose of lexapro  Establish with psychiatrist and counselor outpatient   Continue with AA meetings   Complete blood test at [facility] labs

## 2020-01-15 NOTE — PROGRESS NOTES
Randi Mancini is a 54year old male. HPI:   Since last visit: pt was admitted for acute alcohol intoxication and withdrawal 10/2019.  He went through detox at [facility] and then went to [facility] where he completed an extended day program and an IOP, di HEALTH: feels well otherwise.  Denies fever, chills, unintentional weight change  SKIN: denies any unusual skin lesions or rashes  RESPIRATORY: denies shortness of breath with exertion, denies cough or wheezing  CARDIOVASCULAR: denies chest pain or palpitat 4/15/2020).

## 2020-01-19 PROBLEM — F10.20 ALCOHOL DEPENDENCE (HCC): Status: RESOLVED | Noted: 2019-10-20 | Resolved: 2020-01-19

## 2020-02-25 DIAGNOSIS — F51.01 PRIMARY INSOMNIA: ICD-10-CM

## 2020-02-25 RX ORDER — TRAZODONE HYDROCHLORIDE 50 MG/1
50 TABLET ORAL NIGHTLY PRN
Qty: 30 TABLET | Refills: 2 | Status: SHIPPED | OUTPATIENT
Start: 2020-02-25 | End: 2020-05-12

## 2020-02-25 NOTE — TELEPHONE ENCOUNTER
Spoke to Antonio Martínez  from [address] [place] [address]. inquiring the last time patient had medication refilled was 12/22/2019 with a quantity of 60 tablets. Antonio Martínez informed me that there was documentation from [employer] stating, only a 30 days supply.  Understandi

## 2020-02-25 NOTE — TELEPHONE ENCOUNTER
Patient called and requested a refill on Trazadone 50 mg qty 3 months to be sent to [facility] on file.

## 2020-03-18 DIAGNOSIS — F10.21 ALCOHOL DEPENDENCE IN REMISSION (HCC): ICD-10-CM

## 2020-03-18 DIAGNOSIS — F41.1 GAD (GENERALIZED ANXIETY DISORDER): ICD-10-CM

## 2020-03-18 RX ORDER — CLONAZEPAM 1 MG/1
0.5 TABLET ORAL 2 TIMES DAILY PRN
Qty: 30 TABLET | Refills: 0 | Status: SHIPPED | OUTPATIENT
Start: 2020-03-18 | End: 2020-05-21

## 2020-03-18 NOTE — TELEPHONE ENCOUNTER
Patient is currently scheduled for his 3 mo med check with Humaira Faulkner on 3/27, but he is wanting to postpone that appt. Wants to know if we can send a refill of clonazePAM 1 MG to [facility] on file until he reschedules his med check.

## 2020-03-18 NOTE — TELEPHONE ENCOUNTER
Last time medication was refilled 1/2020   Quantity  and number of refills 30 with 0 refills    Last office visit 1/2020   Next office visit 04/2020  3m follow up postponed for the time being due to infection control

## 2020-04-10 ENCOUNTER — TELEPHONE (OUTPATIENT)
Dept: INTERNAL MEDICINE CLINIC | Facility: CLINIC | Age: 50
End: 2020-04-10

## 2020-04-10 NOTE — TELEPHONE ENCOUNTER
Patient is requesting a letter for unemployment. Stress of his job was causing him to have a relapse. Since he left voluntarily the unemployment office needs a letter stating due to his high anxiety and stress could lead him to relapse again.      Marina

## 2020-04-12 DIAGNOSIS — F41.1 GAD (GENERALIZED ANXIETY DISORDER): ICD-10-CM

## 2020-04-12 DIAGNOSIS — F10.21 ALCOHOL DEPENDENCE IN REMISSION (HCC): ICD-10-CM

## 2020-04-13 RX ORDER — NALTREXONE HYDROCHLORIDE 50 MG/1
TABLET, FILM COATED ORAL
Qty: 30 TABLET | Refills: 0 | Status: SHIPPED | OUTPATIENT
Start: 2020-04-13 | End: 2020-05-18

## 2020-04-17 DIAGNOSIS — F41.1 GAD (GENERALIZED ANXIETY DISORDER): ICD-10-CM

## 2020-04-17 DIAGNOSIS — F10.21 ALCOHOL DEPENDENCE IN REMISSION (HCC): ICD-10-CM

## 2020-04-17 RX ORDER — ESCITALOPRAM OXALATE 20 MG/1
TABLET ORAL
Qty: 30 TABLET | Refills: 0 | Status: SHIPPED | OUTPATIENT
Start: 2020-04-17 | End: 2020-05-18

## 2020-05-12 DIAGNOSIS — F51.01 PRIMARY INSOMNIA: ICD-10-CM

## 2020-05-12 RX ORDER — TRAZODONE HYDROCHLORIDE 50 MG/1
TABLET ORAL
Qty: 30 TABLET | Refills: 0 | Status: SHIPPED | OUTPATIENT
Start: 2020-05-12 | End: 2020-06-16

## 2020-05-18 DIAGNOSIS — F10.21 ALCOHOL DEPENDENCE IN REMISSION (HCC): ICD-10-CM

## 2020-05-18 DIAGNOSIS — F41.1 GAD (GENERALIZED ANXIETY DISORDER): ICD-10-CM

## 2020-05-18 RX ORDER — NALTREXONE HYDROCHLORIDE 50 MG/1
TABLET, FILM COATED ORAL
Qty: 30 TABLET | Refills: 0 | Status: SHIPPED | OUTPATIENT
Start: 2020-05-18 | End: 2020-06-30

## 2020-05-18 RX ORDER — ESCITALOPRAM OXALATE 20 MG/1
TABLET ORAL
Qty: 30 TABLET | Refills: 0 | Status: SHIPPED | OUTPATIENT
Start: 2020-05-18 | End: 2020-06-16

## 2020-05-21 ENCOUNTER — VIRTUAL PHONE E/M (OUTPATIENT)
Dept: INTERNAL MEDICINE CLINIC | Facility: CLINIC | Age: 50
End: 2020-05-21
Payer: COMMERCIAL

## 2020-05-21 DIAGNOSIS — F10.21 ALCOHOL DEPENDENCE IN REMISSION (HCC): ICD-10-CM

## 2020-05-21 DIAGNOSIS — F41.1 GAD (GENERALIZED ANXIETY DISORDER): ICD-10-CM

## 2020-05-21 PROCEDURE — 99442 PHONE E/M BY PHYS 11-20 MIN: CPT | Performed by: NURSE PRACTITIONER

## 2020-05-21 RX ORDER — BUSPIRONE HYDROCHLORIDE 10 MG/1
10 TABLET ORAL DAILY
Qty: 90 TABLET | Refills: 0 | Status: SHIPPED | OUTPATIENT
Start: 2020-05-21 | End: 2020-09-16

## 2020-05-21 RX ORDER — CLONAZEPAM 1 MG/1
0.5 TABLET ORAL 2 TIMES DAILY PRN
Qty: 30 TABLET | Refills: 0 | Status: SHIPPED | OUTPATIENT
Start: 2020-05-21 | End: 2020-07-02

## 2020-05-21 NOTE — PROGRESS NOTES
HPI:    Patient ID: Sofiya Marte is a 54year old male. Patient presents with:  Alcohol Problem  Anxiety    Patient consent to telephone visit today      Patient has been sober for 7 months!  He continues outpatient treatment and talks with a therapist session: 10 or more        Binge frequency: Daily or almost daily        Comment: quit date 12/7/17. 10/20/19 - binge drinking 10-12 drinks per day between wine, beer, captain and coke, dimitris's hard lemonade. Pt reports this last binge started 7 days ago.  CHALINO 275 01/21/2020     Lab Results   Component Value Date    CHOLEST 169 03/24/2017    TRIG 77 03/24/2017    HDL 56 03/24/2017    LDL 98 03/24/2017    VLDL 15 03/24/2017    TCHDLRATIO 3.02 03/24/2017    NONHDLC 113 03/24/2017     Lab Results   Component Value

## 2020-06-16 DIAGNOSIS — F10.21 ALCOHOL DEPENDENCE IN REMISSION (HCC): ICD-10-CM

## 2020-06-16 DIAGNOSIS — F41.1 GAD (GENERALIZED ANXIETY DISORDER): ICD-10-CM

## 2020-06-16 DIAGNOSIS — F51.01 PRIMARY INSOMNIA: ICD-10-CM

## 2020-06-16 RX ORDER — ESCITALOPRAM OXALATE 20 MG/1
TABLET ORAL
Qty: 30 TABLET | Refills: 0 | Status: SHIPPED | OUTPATIENT
Start: 2020-06-16 | End: 2020-07-20

## 2020-06-16 RX ORDER — TRAZODONE HYDROCHLORIDE 50 MG/1
TABLET ORAL
Qty: 30 TABLET | Refills: 0 | Status: SHIPPED | OUTPATIENT
Start: 2020-06-16 | End: 2020-07-29

## 2020-06-30 DIAGNOSIS — F10.21 ALCOHOL DEPENDENCE IN REMISSION (HCC): ICD-10-CM

## 2020-06-30 DIAGNOSIS — F41.1 GAD (GENERALIZED ANXIETY DISORDER): ICD-10-CM

## 2020-06-30 RX ORDER — NALTREXONE HYDROCHLORIDE 50 MG/1
50 TABLET, FILM COATED ORAL DAILY
Qty: 30 TABLET | Refills: 0 | Status: SHIPPED | OUTPATIENT
Start: 2020-06-30 | End: 2020-08-06

## 2020-07-02 DIAGNOSIS — F10.21 ALCOHOL DEPENDENCE IN REMISSION (HCC): ICD-10-CM

## 2020-07-02 DIAGNOSIS — F41.1 GAD (GENERALIZED ANXIETY DISORDER): ICD-10-CM

## 2020-07-02 RX ORDER — CLONAZEPAM 1 MG/1
0.5 TABLET ORAL 2 TIMES DAILY PRN
Qty: 30 TABLET | Refills: 0 | Status: SHIPPED | OUTPATIENT
Start: 2020-07-02 | End: 2020-08-06

## 2020-07-02 NOTE — TELEPHONE ENCOUNTER
Last time medication was refilled 5/21/2020  Quantity and number of refills 30 w/0  Last OV 5/21/2020  Next OV 8/2020

## 2020-07-02 NOTE — TELEPHONE ENCOUNTER
Patient requests a refill on   clonazePAM 1 MG Oral Tab 30 tablet     To be sent to [facility] on file. He is leaving town today.

## 2020-07-20 DIAGNOSIS — F41.1 GAD (GENERALIZED ANXIETY DISORDER): ICD-10-CM

## 2020-07-20 DIAGNOSIS — F10.21 ALCOHOL DEPENDENCE IN REMISSION (HCC): ICD-10-CM

## 2020-07-20 RX ORDER — ESCITALOPRAM OXALATE 20 MG/1
TABLET ORAL
Qty: 30 TABLET | Refills: 0 | Status: SHIPPED | OUTPATIENT
Start: 2020-07-20 | End: 2020-08-27

## 2020-07-20 NOTE — TELEPHONE ENCOUNTER
Last time medication was refilled 6/16/2020  Meme Goodman and number of refills 30 w/ 0  Last OV 5/21/2020  Next OV 8/2020

## 2020-07-29 DIAGNOSIS — F51.01 PRIMARY INSOMNIA: ICD-10-CM

## 2020-07-29 RX ORDER — TRAZODONE HYDROCHLORIDE 50 MG/1
TABLET ORAL
Qty: 30 TABLET | Refills: 0 | Status: SHIPPED | OUTPATIENT
Start: 2020-07-29 | End: 2020-08-25

## 2020-08-06 DIAGNOSIS — F10.21 ALCOHOL DEPENDENCE IN REMISSION (HCC): ICD-10-CM

## 2020-08-06 DIAGNOSIS — F41.1 GAD (GENERALIZED ANXIETY DISORDER): ICD-10-CM

## 2020-08-06 RX ORDER — NALTREXONE HYDROCHLORIDE 50 MG/1
50 TABLET, FILM COATED ORAL DAILY
Qty: 30 TABLET | Refills: 0 | Status: SHIPPED | OUTPATIENT
Start: 2020-08-06 | End: 2020-09-16

## 2020-08-06 RX ORDER — CLONAZEPAM 1 MG/1
0.5 TABLET ORAL 2 TIMES DAILY PRN
Qty: 30 TABLET | Refills: 0 | Status: SHIPPED | OUTPATIENT
Start: 2020-08-06 | End: 2020-09-16

## 2020-08-06 NOTE — TELEPHONE ENCOUNTER
Naltrexone  Last time medication was refilled 6/30/2020  Quantity and number of refills 30 w/ 0   Last OV 5/21/2020  Next OV 8/2020

## 2020-08-25 DIAGNOSIS — F51.01 PRIMARY INSOMNIA: ICD-10-CM

## 2020-08-25 RX ORDER — TRAZODONE HYDROCHLORIDE 50 MG/1
TABLET ORAL
Qty: 30 TABLET | Refills: 0 | Status: SHIPPED | OUTPATIENT
Start: 2020-08-25 | End: 2020-09-16

## 2020-08-27 DIAGNOSIS — F41.1 GAD (GENERALIZED ANXIETY DISORDER): ICD-10-CM

## 2020-08-27 DIAGNOSIS — F10.21 ALCOHOL DEPENDENCE IN REMISSION (HCC): ICD-10-CM

## 2020-08-27 RX ORDER — ESCITALOPRAM OXALATE 20 MG/1
TABLET ORAL
Qty: 30 TABLET | Refills: 0 | Status: SHIPPED | OUTPATIENT
Start: 2020-08-27 | End: 2020-09-16

## 2020-09-16 ENCOUNTER — VIRTUAL PHONE E/M (OUTPATIENT)
Dept: INTERNAL MEDICINE CLINIC | Facility: CLINIC | Age: 50
End: 2020-09-16

## 2020-09-16 DIAGNOSIS — F10.21 ALCOHOL DEPENDENCE IN REMISSION (HCC): ICD-10-CM

## 2020-09-16 DIAGNOSIS — F51.01 PRIMARY INSOMNIA: ICD-10-CM

## 2020-09-16 DIAGNOSIS — F41.1 GAD (GENERALIZED ANXIETY DISORDER): ICD-10-CM

## 2020-09-16 PROCEDURE — 99441 PHONE E/M BY PHYS 5-10 MIN: CPT | Performed by: NURSE PRACTITIONER

## 2020-09-16 RX ORDER — NALTREXONE HYDROCHLORIDE 50 MG/1
50 TABLET, FILM COATED ORAL DAILY
Qty: 30 TABLET | Refills: 2 | Status: SHIPPED | OUTPATIENT
Start: 2020-09-16 | End: 2020-12-19

## 2020-09-16 RX ORDER — TRAZODONE HYDROCHLORIDE 50 MG/1
50 TABLET ORAL NIGHTLY PRN
Qty: 30 TABLET | Refills: 2 | Status: SHIPPED | OUTPATIENT
Start: 2020-09-16 | End: 2021-05-27

## 2020-09-16 RX ORDER — CLONAZEPAM 1 MG/1
0.5 TABLET ORAL 2 TIMES DAILY PRN
Qty: 30 TABLET | Refills: 0 | Status: SHIPPED | OUTPATIENT
Start: 2020-09-16 | End: 2020-10-19

## 2020-09-16 RX ORDER — FLUVOXAMINE MALEATE 100 MG
200 TABLET ORAL NIGHTLY
Qty: 60 TABLET | Refills: 0 | Status: SHIPPED | OUTPATIENT
Start: 2020-09-16 | End: 2020-10-19

## 2020-09-16 RX ORDER — BUSPIRONE HYDROCHLORIDE 10 MG/1
10 TABLET ORAL DAILY
Qty: 90 TABLET | Refills: 0 | Status: SHIPPED | OUTPATIENT
Start: 2020-09-16 | End: 2020-12-11

## 2020-09-16 NOTE — PROGRESS NOTES
HPI:    Patient ID: Marimar iKm is a 54year old male. Patient presents with:   Anxiety    Patient consent to telephone visit today      Reports he was previously taking fluvoxamine 200 mg once daily to help with anxiety when he got out of rehab for a last binge started 7 days ago. Pt reports he binged drank about 2-3 times since March 2019      Drug use: Not Currently        Types: Cannabis        Comment: Smokes cannabis occasionally-daily per pt.      Other Topics      Concerns:        Caffeine Concer 03/24/2017    Galroberto 113 03/24/2017     Lab Results   Component Value Date     01/21/2016    A1C 5.3 01/21/2016     Lab Results   Component Value Date    TSH 2.020 01/21/2016     No results found for: VITD, QVITD, VITD25, [facility]  Lab Results   Co

## 2020-09-22 DIAGNOSIS — F10.21 ALCOHOL DEPENDENCE IN REMISSION (HCC): ICD-10-CM

## 2020-09-22 DIAGNOSIS — F41.1 GAD (GENERALIZED ANXIETY DISORDER): ICD-10-CM

## 2020-09-22 RX ORDER — ESCITALOPRAM OXALATE 20 MG/1
TABLET ORAL
Qty: 30 TABLET | Refills: 0 | OUTPATIENT
Start: 2020-09-22

## 2020-10-19 DIAGNOSIS — F10.21 ALCOHOL DEPENDENCE IN REMISSION (HCC): ICD-10-CM

## 2020-10-19 DIAGNOSIS — F41.1 GAD (GENERALIZED ANXIETY DISORDER): ICD-10-CM

## 2020-10-19 RX ORDER — NALTREXONE HYDROCHLORIDE 50 MG/1
50 TABLET, FILM COATED ORAL DAILY
Qty: 30 TABLET | Refills: 2 | OUTPATIENT
Start: 2020-10-19

## 2020-10-19 RX ORDER — CLONAZEPAM 1 MG/1
0.5 TABLET ORAL 2 TIMES DAILY PRN
Qty: 30 TABLET | Refills: 0 | Status: SHIPPED | OUTPATIENT
Start: 2020-10-19 | End: 2020-11-24

## 2020-10-19 RX ORDER — FLUVOXAMINE MALEATE 100 MG
200 TABLET ORAL NIGHTLY
Qty: 60 TABLET | Refills: 0 | Status: SHIPPED | OUTPATIENT
Start: 2020-10-19 | End: 2020-11-15

## 2020-10-19 NOTE — TELEPHONE ENCOUNTER
Patient asking for refills of the following:  fluvoxaMINE Maleate 100 MG   Naltrexone HCl 50 MG   clonazePAM 1 MG    Please send to [facility] on [facility].

## 2020-10-19 NOTE — TELEPHONE ENCOUNTER
Fluvoxamine    Last time medication was refilled 9/16/2020   Quantity and number of refills 60 w/ 0   Last OV 9/16/2020  Next OV 10/2020         Clonazepam  Last time medication was refilled 9/16/2020   Quantity and number of refills 30 w/ 0   Last OV 9/16

## 2020-11-14 DIAGNOSIS — F41.1 GAD (GENERALIZED ANXIETY DISORDER): ICD-10-CM

## 2020-11-15 RX ORDER — FLUVOXAMINE MALEATE 100 MG
TABLET ORAL
Qty: 60 TABLET | Refills: 0 | Status: SHIPPED | OUTPATIENT
Start: 2020-11-15 | End: 2020-12-19

## 2020-11-24 DIAGNOSIS — F41.1 GAD (GENERALIZED ANXIETY DISORDER): ICD-10-CM

## 2020-11-24 RX ORDER — CLONAZEPAM 1 MG/1
0.5 TABLET ORAL 2 TIMES DAILY PRN
Qty: 30 TABLET | Refills: 0 | Status: SHIPPED | OUTPATIENT
Start: 2020-11-24 | End: 2020-12-24

## 2020-11-24 NOTE — TELEPHONE ENCOUNTER
Last time medication was refilled 10/19  Quantity and number of refills 30 w/  0  Last OV 9/16  Next OV 12/2020

## 2020-12-11 DIAGNOSIS — F41.1 GAD (GENERALIZED ANXIETY DISORDER): ICD-10-CM

## 2020-12-11 RX ORDER — BUSPIRONE HYDROCHLORIDE 10 MG/1
TABLET ORAL
Qty: 90 TABLET | Refills: 0 | Status: SHIPPED | OUTPATIENT
Start: 2020-12-11 | End: 2021-01-29

## 2020-12-19 DIAGNOSIS — F10.21 ALCOHOL DEPENDENCE IN REMISSION (HCC): ICD-10-CM

## 2020-12-19 DIAGNOSIS — F41.1 GAD (GENERALIZED ANXIETY DISORDER): ICD-10-CM

## 2020-12-19 RX ORDER — FLUVOXAMINE MALEATE 100 MG
TABLET ORAL
Qty: 60 TABLET | Refills: 0 | Status: SHIPPED | OUTPATIENT
Start: 2020-12-19 | End: 2021-01-19

## 2020-12-19 RX ORDER — NALTREXONE HYDROCHLORIDE 50 MG/1
TABLET, FILM COATED ORAL
Qty: 30 TABLET | Refills: 0 | Status: SHIPPED | OUTPATIENT
Start: 2020-12-19 | End: 2021-02-12

## 2020-12-24 DIAGNOSIS — F41.1 GAD (GENERALIZED ANXIETY DISORDER): ICD-10-CM

## 2020-12-25 RX ORDER — CLONAZEPAM 1 MG/1
0.5 TABLET ORAL 2 TIMES DAILY PRN
Qty: 30 TABLET | Refills: 0 | Status: SHIPPED | OUTPATIENT
Start: 2020-12-25 | End: 2021-01-28

## 2021-01-19 DIAGNOSIS — F41.1 GAD (GENERALIZED ANXIETY DISORDER): ICD-10-CM

## 2021-01-19 RX ORDER — FLUVOXAMINE MALEATE 100 MG
TABLET ORAL
Qty: 60 TABLET | Refills: 0 | Status: SHIPPED | OUTPATIENT
Start: 2021-01-19 | End: 2021-02-22

## 2021-01-19 NOTE — TELEPHONE ENCOUNTER
Last time medication was refilled 12/19/20.   Quantity and number of refills 60 w/ 0   Last OV 9/16/20  Next OV 10/20

## 2021-01-28 DIAGNOSIS — F41.1 GAD (GENERALIZED ANXIETY DISORDER): ICD-10-CM

## 2021-01-28 RX ORDER — CLONAZEPAM 1 MG/1
0.5 TABLET ORAL 2 TIMES DAILY PRN
Qty: 30 TABLET | Refills: 0 | Status: SHIPPED | OUTPATIENT
Start: 2021-01-28 | End: 2021-03-01

## 2021-01-29 DIAGNOSIS — F41.1 GAD (GENERALIZED ANXIETY DISORDER): ICD-10-CM

## 2021-01-29 RX ORDER — BUSPIRONE HYDROCHLORIDE 10 MG/1
10 TABLET ORAL DAILY
Qty: 90 TABLET | Refills: 0 | OUTPATIENT
Start: 2021-01-29

## 2021-01-29 NOTE — PROGRESS NOTES
HPI:    Patient ID: Christina Crowder is a 52year old male. Patient presents with:  Medication Request: Medication dosage questions    This visit is conducted using Telemedicine with live, interactive video and audio.     Anxiety level has not been control use: Not Currently        Types: Cannabis        Comment: Smokes cannabis occasionally-daily per pt.      Other Topics      Concerns:        Caffeine Concern: Yes          1 can daily        Exercise: No        Seat Belt: Yes         Current Outpatient Medi Lab Results   Component Value Date    TSH 2.020 01/21/2016     No results found for: VITD, QVITD, VITD25, [facility]  Lab Results   Component Value Date    PERLA 1,206.3 (H) 12/07/2017     Lab Results   Component Value Date    FOLIC 2.1 (L) 97/88/8124

## 2021-02-12 NOTE — PROGRESS NOTES
HPI:    Patient ID: Sacha Samson is a 52year old male. Patient presents with: Anxiety    This visit is conducted using Telemedicine with live, interactive video and audio.     Overall doing well, has noted some improvement with increase in buspar 15 reports he binged drank about 2-3 times since March 2019      Drug use: Not Currently        Types: Cannabis        Comment: Smokes cannabis occasionally-daily per pt.      Other Topics      Concerns:        Caffeine Concern: Yes          1 can daily Lab Results   Component Value Date     01/21/2016    A1C 5.3 01/21/2016     Lab Results   Component Value Date    TSH 2.020 01/21/2016     No results found for: VITD, QVITD, VITD25, ETBB70ZD  Lab Results   Component Value Date    PERLA 1,206.3 (H)

## 2021-02-17 DIAGNOSIS — F41.1 GAD (GENERALIZED ANXIETY DISORDER): ICD-10-CM

## 2021-02-17 RX ORDER — BUSPIRONE HYDROCHLORIDE 15 MG/1
TABLET ORAL
Qty: 60 TABLET | Refills: 0 | OUTPATIENT
Start: 2021-02-17

## 2021-02-21 DIAGNOSIS — F41.1 GAD (GENERALIZED ANXIETY DISORDER): ICD-10-CM

## 2021-02-22 RX ORDER — FLUVOXAMINE MALEATE 100 MG
TABLET ORAL
Qty: 60 TABLET | Refills: 2 | Status: SHIPPED | OUTPATIENT
Start: 2021-02-22 | End: 2021-06-10

## 2021-03-01 DIAGNOSIS — F41.1 GAD (GENERALIZED ANXIETY DISORDER): ICD-10-CM

## 2021-03-01 RX ORDER — BUSPIRONE HYDROCHLORIDE 15 MG/1
15 TABLET ORAL 2 TIMES DAILY
Qty: 60 TABLET | Refills: 2 | Status: SHIPPED | OUTPATIENT
Start: 2021-03-01 | End: 2021-05-27

## 2021-03-01 RX ORDER — CLONAZEPAM 1 MG/1
0.5 TABLET ORAL 2 TIMES DAILY PRN
Qty: 30 TABLET | Refills: 2 | Status: SHIPPED | OUTPATIENT
Start: 2021-03-01 | End: 2021-05-27

## 2021-04-30 DIAGNOSIS — F41.1 GAD (GENERALIZED ANXIETY DISORDER): ICD-10-CM

## 2021-04-30 RX ORDER — CLONAZEPAM 1 MG/1
0.5 TABLET ORAL 2 TIMES DAILY PRN
Qty: 30 TABLET | Refills: 2 | OUTPATIENT
Start: 2021-04-30

## 2021-05-27 ENCOUNTER — TELEMEDICINE (OUTPATIENT)
Dept: INTERNAL MEDICINE CLINIC | Facility: CLINIC | Age: 51
End: 2021-05-27

## 2021-05-27 DIAGNOSIS — F41.1 GAD (GENERALIZED ANXIETY DISORDER): ICD-10-CM

## 2021-05-27 PROCEDURE — 99441 PHONE E/M BY PHYS 5-10 MIN: CPT | Performed by: NURSE PRACTITIONER

## 2021-05-27 RX ORDER — CLONAZEPAM 1 MG/1
TABLET ORAL
Qty: 30 TABLET | Refills: 0 | Status: SHIPPED | OUTPATIENT
Start: 2021-05-27 | End: 2021-05-27

## 2021-05-27 RX ORDER — CLONAZEPAM 1 MG/1
0.5 TABLET ORAL 2 TIMES DAILY PRN
Qty: 30 TABLET | Refills: 2 | Status: SHIPPED | OUTPATIENT
Start: 2021-05-27 | End: 2021-09-28

## 2021-05-27 NOTE — PROGRESS NOTES
HPI:    Patient ID: Randi Mancini is a 52year old male. Patient presents with: Anxiety: Clonazepam    Patient consent to telephone visit today    High anxiety, started drinking again for past 6 weeks and stopped on Saturday. Using clonazepam prn.  Sta Caffeine Concern: Yes          1 can daily        Exercise: No        Seat Belt: Yes         Current Outpatient Medications   Medication Sig Dispense Refill   • clonazePAM 1 MG Oral Tab Take 0.5 tablets (0.5 mg total) by mouth 2 (two) times daily as need Component Value Date    MG 2.3 10/21/2019        PHYSICAL EXAM:   There were no vitals taken for this visit.   Physical Exam    - no respiratory distress  - able to speak in full sentences  - alert and oriented          ASSESSMENT/PLAN:   Diagnoses and al

## 2021-06-10 DIAGNOSIS — F41.1 GAD (GENERALIZED ANXIETY DISORDER): ICD-10-CM

## 2021-06-10 RX ORDER — FLUVOXAMINE MALEATE 100 MG
TABLET ORAL
Qty: 60 TABLET | Refills: 0 | Status: SHIPPED | OUTPATIENT
Start: 2021-06-10 | End: 2021-07-08

## 2021-06-24 DIAGNOSIS — F41.1 GAD (GENERALIZED ANXIETY DISORDER): ICD-10-CM

## 2021-06-24 RX ORDER — CLONAZEPAM 1 MG/1
0.5 TABLET ORAL 2 TIMES DAILY PRN
Qty: 30 TABLET | Refills: 2 | OUTPATIENT
Start: 2021-06-24

## 2021-06-25 DIAGNOSIS — F41.1 GAD (GENERALIZED ANXIETY DISORDER): ICD-10-CM

## 2021-06-25 RX ORDER — CLONAZEPAM 1 MG/1
0.5 TABLET ORAL 2 TIMES DAILY PRN
Qty: 30 TABLET | Refills: 2 | Status: CANCELLED | OUTPATIENT
Start: 2021-06-25

## 2021-07-08 DIAGNOSIS — F41.1 GAD (GENERALIZED ANXIETY DISORDER): ICD-10-CM

## 2021-07-08 RX ORDER — FLUVOXAMINE MALEATE 100 MG
200 TABLET ORAL NIGHTLY
Qty: 60 TABLET | Refills: 0 | Status: SHIPPED | OUTPATIENT
Start: 2021-07-08 | End: 2021-08-30

## 2021-08-20 DIAGNOSIS — F41.1 GAD (GENERALIZED ANXIETY DISORDER): ICD-10-CM

## 2021-08-20 RX ORDER — CLONAZEPAM 1 MG/1
0.5 TABLET ORAL 2 TIMES DAILY PRN
Qty: 30 TABLET | Refills: 2 | OUTPATIENT
Start: 2021-08-20

## 2021-08-28 DIAGNOSIS — F41.1 GAD (GENERALIZED ANXIETY DISORDER): ICD-10-CM

## 2021-08-29 DIAGNOSIS — F41.1 GAD (GENERALIZED ANXIETY DISORDER): ICD-10-CM

## 2021-08-29 RX ORDER — FLUVOXAMINE MALEATE 100 MG
200 TABLET ORAL NIGHTLY
Qty: 60 TABLET | Refills: 0 | Status: CANCELLED | OUTPATIENT
Start: 2021-08-29

## 2021-08-30 RX ORDER — FLUVOXAMINE MALEATE 100 MG
TABLET ORAL
Qty: 60 TABLET | Refills: 2 | Status: SHIPPED | OUTPATIENT
Start: 2021-08-30

## 2021-08-30 NOTE — TELEPHONE ENCOUNTER
Last time medication was refilled 7/8/21  Quantity and # of refills 60 tab w/ 0 refill  Last OV 5/27/21  Next OV never done

## 2021-09-28 DIAGNOSIS — F41.1 GAD (GENERALIZED ANXIETY DISORDER): ICD-10-CM

## 2021-09-28 RX ORDER — CLONAZEPAM 1 MG/1
0.5 TABLET ORAL 2 TIMES DAILY PRN
Qty: 30 TABLET | Refills: 0 | Status: SHIPPED | OUTPATIENT
Start: 2021-09-28 | End: 2021-10-29

## 2021-09-28 NOTE — TELEPHONE ENCOUNTER
Requested Prescriptions     Pending Prescriptions Disp Refills   • clonazePAM 1 MG Oral Tab 30 tablet 2     Sig: Take 0.5 tablets (0.5 mg total) by mouth 2 (two) times daily as needed for Anxiety.      Last refill #30  2 on 5/27/2021  last office visit pert

## 2021-10-29 DIAGNOSIS — F41.1 GAD (GENERALIZED ANXIETY DISORDER): ICD-10-CM

## 2021-10-29 RX ORDER — CLONAZEPAM 1 MG/1
TABLET ORAL
Qty: 30 TABLET | Refills: 0 | Status: SHIPPED | OUTPATIENT
Start: 2021-10-29 | End: 2021-12-02

## 2021-10-29 NOTE — TELEPHONE ENCOUNTER
Last time medication was refilled 9/28/21  Quantity and number of refills 30 w/0   Last OV 5/27/21  Next OV 9/21

## 2021-12-02 DIAGNOSIS — F41.1 GAD (GENERALIZED ANXIETY DISORDER): ICD-10-CM

## 2021-12-02 RX ORDER — CLONAZEPAM 1 MG/1
TABLET ORAL
Qty: 30 TABLET | Refills: 0 | Status: SHIPPED | OUTPATIENT
Start: 2021-12-02 | End: 2022-01-03

## 2021-12-02 NOTE — TELEPHONE ENCOUNTER
Last time medication was refilled 10/29/21  Quantity and number of refills 30 w/ 0   Last OV 5/27/21  Next OV 11/21

## 2021-12-30 ENCOUNTER — PATIENT MESSAGE (OUTPATIENT)
Dept: INTERNAL MEDICINE CLINIC | Facility: CLINIC | Age: 51
End: 2021-12-30

## 2021-12-30 DIAGNOSIS — F41.1 GAD (GENERALIZED ANXIETY DISORDER): ICD-10-CM

## 2021-12-30 RX ORDER — CLONAZEPAM 1 MG/1
TABLET ORAL
Qty: 30 TABLET | Refills: 0 | OUTPATIENT
Start: 2021-12-30

## 2021-12-30 NOTE — TELEPHONE ENCOUNTER
Last time medication was refilled 12/2/21  Quantity and # of refills 30 tab w/ 0 refill  Last OV 5/27/21  Next OV physical never done, overdue

## 2022-01-03 RX ORDER — CLONAZEPAM 1 MG/1
0.5 TABLET ORAL 2 TIMES DAILY PRN
Qty: 7 TABLET | Refills: 0 | Status: SHIPPED | OUTPATIENT
Start: 2022-01-03 | End: 2022-01-10

## 2022-01-03 NOTE — TELEPHONE ENCOUNTER
Patient requesting refill of Klonopin however due for office appointment. Notified patient of need for OV but states cannot come in due to financial reason. Per Dr. Yissel dominguez to fill two week supply of Klonopin.  After two week supply patient must be seen in

## 2022-02-21 ENCOUNTER — PATIENT MESSAGE (OUTPATIENT)
Dept: INTERNAL MEDICINE CLINIC | Facility: CLINIC | Age: 52
End: 2022-02-21

## 2022-02-21 RX ORDER — CLONAZEPAM 1 MG/1
0.5 TABLET ORAL 2 TIMES DAILY PRN
Qty: 30 TABLET | Refills: 0 | Status: SHIPPED | OUTPATIENT
Start: 2022-02-21 | End: 2022-03-14

## 2022-03-09 RX ORDER — FLUVOXAMINE MALEATE 100 MG
TABLET ORAL
Qty: 60 TABLET | Refills: 0 | Status: SHIPPED | OUTPATIENT
Start: 2022-03-09

## 2022-03-09 NOTE — TELEPHONE ENCOUNTER
Last time medication was refilled 8/30/21  Quantity and # of refills 60 tab w/ 2 refills  Last OV 5/27/21  Next OV 3/14/22

## 2022-03-14 PROBLEM — Z28.21 COVID-19 VACCINATION DECLINED: Status: ACTIVE | Noted: 2022-03-14

## 2022-05-11 DIAGNOSIS — F41.1 GAD (GENERALIZED ANXIETY DISORDER): ICD-10-CM

## 2022-05-11 RX ORDER — FLUVOXAMINE MALEATE 100 MG
200 TABLET ORAL NIGHTLY
Qty: 60 TABLET | Refills: 0 | Status: SHIPPED | OUTPATIENT
Start: 2022-05-11 | End: 2022-06-06

## 2022-05-11 NOTE — TELEPHONE ENCOUNTER
Fluvoxamine  Last time medication was refilled 3/9/22  Quantity and # of refills 30 tab no refill  Last OV 3/14/22  Next OV no apt

## 2022-05-25 DIAGNOSIS — F41.1 GAD (GENERALIZED ANXIETY DISORDER): ICD-10-CM

## 2022-05-25 RX ORDER — CLONAZEPAM 1 MG/1
0.5 TABLET ORAL 2 TIMES DAILY PRN
Qty: 30 TABLET | Refills: 0 | Status: SHIPPED | OUTPATIENT
Start: 2022-05-25

## 2022-05-25 NOTE — TELEPHONE ENCOUNTER
Patient requesting refill of:  clonazePAM 1 MG Oral Tab 30 tablet       To be sent to:   [place] [address], [address] [place] [address], [address] [place] [address], [phone]

## 2022-06-06 DIAGNOSIS — F41.1 GAD (GENERALIZED ANXIETY DISORDER): ICD-10-CM

## 2022-06-06 RX ORDER — FLUVOXAMINE MALEATE 100 MG
TABLET ORAL
Qty: 60 TABLET | Refills: 0 | Status: SHIPPED | OUTPATIENT
Start: 2022-06-06

## 2022-07-06 DIAGNOSIS — F41.1 GAD (GENERALIZED ANXIETY DISORDER): ICD-10-CM

## 2022-07-06 RX ORDER — CLONAZEPAM 1 MG/1
0.5 TABLET ORAL 2 TIMES DAILY PRN
Qty: 30 TABLET | Refills: 0 | Status: SHIPPED | OUTPATIENT
Start: 2022-07-06

## 2022-07-06 NOTE — TELEPHONE ENCOUNTER
Clonazepam  Last time medication was refilled 5/25/22  Quantity and # of refills 30 tab   Last OV VV 3/14/22  Next OV no apt scheduled

## 2022-07-06 NOTE — TELEPHONE ENCOUNTER
Patient requested refill of:  clonazePAM 1 MG Oral Tab    To be sent to:   [address], [address] [place] [address], [phone]

## 2022-07-13 DIAGNOSIS — F41.1 GAD (GENERALIZED ANXIETY DISORDER): ICD-10-CM

## 2022-07-13 RX ORDER — FLUVOXAMINE MALEATE 100 MG
200 TABLET ORAL NIGHTLY
Qty: 60 TABLET | Refills: 0 | Status: SHIPPED | OUTPATIENT
Start: 2022-07-13

## 2022-08-22 DIAGNOSIS — F41.1 GAD (GENERALIZED ANXIETY DISORDER): ICD-10-CM

## 2022-08-22 PROBLEM — Z28.21 COVID-19 VACCINATION DECLINED: Status: RESOLVED | Noted: 2022-03-14 | Resolved: 2022-08-22

## 2022-08-22 RX ORDER — CLONAZEPAM 1 MG/1
0.5 TABLET ORAL 2 TIMES DAILY PRN
Qty: 30 TABLET | Refills: 0 | Status: SHIPPED | OUTPATIENT
Start: 2022-08-22

## 2022-08-22 NOTE — TELEPHONE ENCOUNTER
Pt is covid positive on Saturday. Symptoms have improved. Still has cold sweats and sore throat. No fever no headache or body aches anymore. Pls advise any additional suggestions or instructions if needed.        Refill req    Pt is completely out of    clonazePAM 1 MG Oral Tab    Please send to [facility]

## 2022-08-22 NOTE — TELEPHONE ENCOUNTER
Confirmed COVID f/u call  Symptom onset: 8/19  Positive test date: 8/20   Isolation period ends: 8/25 if fever free and symptoms have improved  Mask wearing begins:  8/25 through 8/29  Restrictions end:  8/30  Symptoms improved: yes  Temperature: resolved   Cough: no  Shortness of breath: no  Any other symptoms: sore throat   Pulse ox:  No   D/w pt isolation period, mask wearing period and when restrictions end. Pt was advised of the following:      - when to get covid booster if applicable  - contact office with worsening non emergent symptoms or further questions  - proceed to ER if any of the following begin:  worsening difficulty breathing/shortness of breath, high fever not controlled by fever reducing agents and pulse ox less than 90% if monitoring. Pt expressed understanding. Pt still is without insurance. Paxlovid offered but pt declined.      Sent to Dr. Leonila Garcia for approval.

## 2022-09-16 DIAGNOSIS — F41.1 GAD (GENERALIZED ANXIETY DISORDER): ICD-10-CM

## 2022-09-16 RX ORDER — FLUVOXAMINE MALEATE 100 MG
200 TABLET ORAL NIGHTLY
Qty: 180 TABLET | Refills: 0 | Status: SHIPPED | OUTPATIENT
Start: 2022-09-16

## 2022-09-16 NOTE — TELEPHONE ENCOUNTER
Is patient out of meds or supply very low:   Patient will be out of medication 9/17/22    Medication requested: Fluvoxamine  Dose: 100 mg    Is patient requesting 30 or 90 day supply:    90 day.     Pharmacy name/location:    [place] [address], [address] [place] [address], [address] [place] [address] [address] [phone], [phone]    Is the patient due for appointment:  (if so, please schedule)  no  Additional notes:

## 2022-09-19 DIAGNOSIS — F41.1 GAD (GENERALIZED ANXIETY DISORDER): ICD-10-CM

## 2022-09-19 RX ORDER — FLUVOXAMINE MALEATE 100 MG
200 TABLET ORAL NIGHTLY
Qty: 180 TABLET | Refills: 0 | OUTPATIENT
Start: 2022-09-19

## 2022-09-19 RX ORDER — CLONAZEPAM 1 MG/1
0.5 TABLET ORAL 2 TIMES DAILY PRN
Qty: 30 TABLET | Refills: 0 | Status: SHIPPED | OUTPATIENT
Start: 2022-09-19

## 2022-09-19 NOTE — TELEPHONE ENCOUNTER
Confirmed with pharmacy medication was received on 9/16 and available for . Patient notified via [address] and informed to schedule f/u VV.

## 2022-09-19 NOTE — TELEPHONE ENCOUNTER
Taykey message sent to patient, over due for appointment.      Clonazepam 1 mg tab  Last time medication was refilled 8/22/22  Quantity and # of refills 30 tab no refill  Last OV VV 3/14/22  Next OV over due on 6/24/22 for f/u

## 2022-09-20 ENCOUNTER — TELEPHONE (OUTPATIENT)
Dept: INTERNAL MEDICINE CLINIC | Facility: CLINIC | Age: 52
End: 2022-09-20

## 2022-09-20 DIAGNOSIS — F41.1 GAD (GENERALIZED ANXIETY DISORDER): ICD-10-CM

## 2022-09-20 RX ORDER — FLUVOXAMINE MALEATE 100 MG
100 TABLET ORAL NIGHTLY
Refills: 0 | COMMUNITY
Start: 2022-09-20

## 2022-09-20 NOTE — TELEPHONE ENCOUNTER
Fax received stating pt only takes med 1 time in the PM:  fluvoxaMINE 100 MG Oral Tab    Please advise    [address] [place] [address] [address] - [address] [facility] 12 59 [phone], [phone]

## 2022-10-21 DIAGNOSIS — F41.1 GAD (GENERALIZED ANXIETY DISORDER): ICD-10-CM

## 2022-10-21 RX ORDER — FLUVOXAMINE MALEATE 100 MG
100 TABLET ORAL NIGHTLY
Qty: 90 TABLET | Refills: 0 | Status: SHIPPED | OUTPATIENT
Start: 2022-10-21

## 2022-10-21 NOTE — TELEPHONE ENCOUNTER
Refill req     Out of medication     fluvoxaMINE 100 MG Oral Tab  60 tablet     [facility] [id number] - [address] [facility], [address] [phone], [phone]    Last OV 1/15/2022  Future Appointments   Date Time Provider Dhara Wall   10/25/2022  9:30 AM TEX Mcgrath [facility] [address]     Hasn't been in the office since 2020. Was advised to come so we could re-fill his medications. Pt informed us he doesn't have insurance. Was okay with setting up a video visit and is aware he will receive a bill.

## 2022-11-22 DIAGNOSIS — F32.A DEPRESSION, UNSPECIFIED DEPRESSION TYPE: ICD-10-CM

## 2022-11-22 DIAGNOSIS — F41.1 GAD (GENERALIZED ANXIETY DISORDER): ICD-10-CM

## 2022-11-22 RX ORDER — FLUVOXAMINE MALEATE 100 MG
150 TABLET ORAL NIGHTLY
Qty: 45 TABLET | Refills: 0 | Status: SHIPPED | OUTPATIENT
Start: 2022-11-22

## 2022-11-22 RX ORDER — CLONAZEPAM 1 MG/1
0.5 TABLET ORAL 2 TIMES DAILY PRN
Qty: 30 TABLET | Refills: 0 | Status: SHIPPED | OUTPATIENT
Start: 2022-11-22

## 2022-11-22 NOTE — TELEPHONE ENCOUNTER
Refill req     fluvoxaMINE 100 MG Oral Tab  45 tablet     clonazePAM 1 MG Oral Tab  30 tablet     [facility] - [address] [phone], [phone]    Last OV telemedicine 10/25/2022  No future appointments.

## 2022-11-22 NOTE — TELEPHONE ENCOUNTER
Fluvoxamine 150 mg tab  Last time medication was refilled 1/25/22  Quantity and # of refills 45 tab  Last OV VV 10/25/22  Next OV no upcoming appt, due for 4 week follow up patient made aware      Clonazepam 1 mg tab  Last time medication was refilled 10/25/22  Quantity and # of refills 30 tab no refill

## 2022-12-22 DIAGNOSIS — F41.1 GAD (GENERALIZED ANXIETY DISORDER): ICD-10-CM

## 2022-12-22 RX ORDER — CLONAZEPAM 1 MG/1
0.5 TABLET ORAL 2 TIMES DAILY PRN
Qty: 30 TABLET | Refills: 0 | Status: SHIPPED | OUTPATIENT
Start: 2022-12-22

## 2022-12-22 NOTE — TELEPHONE ENCOUNTER
Medication requested: Clonazepam  Dose:1mg    Is patient requesting 30 or 90 day supply:  30    Pharmacy name/location:  [place] [address], [address] [place] [address] [phone], [phone]    Is the patient due for appointment:  (if so, please schedule)    Additional notes:

## 2022-12-22 NOTE — TELEPHONE ENCOUNTER
Last time medication was refilled 11/22/22   Quantity and # of refills 30 tabs w/0 refills   Last OV  10/25/22 telemed visit   Next OV No future appointments.   Per protocol to provider

## 2022-12-28 DIAGNOSIS — F41.1 GAD (GENERALIZED ANXIETY DISORDER): ICD-10-CM

## 2022-12-28 DIAGNOSIS — F32.A DEPRESSION, UNSPECIFIED DEPRESSION TYPE: ICD-10-CM

## 2022-12-28 RX ORDER — FLUVOXAMINE MALEATE 100 MG
150 TABLET ORAL NIGHTLY
Qty: 45 TABLET | Refills: 2 | Status: SHIPPED | OUTPATIENT
Start: 2022-12-28

## 2022-12-28 NOTE — TELEPHONE ENCOUNTER
Medication requested: FluvoxaMINE  Dose: 100MG tablet    Is patient requesting 30 or 90 day supply:  45    Pharmacy name/location:  [facility] [address], [address] [facility], [address] [facility] [address] [phone], [phone]    Is the patient due for appointment: yes (if so, please schedule)    Additional notes:  VV with Natalia Morales today 12/28

## 2023-01-23 DIAGNOSIS — F41.1 GAD (GENERALIZED ANXIETY DISORDER): ICD-10-CM

## 2023-01-23 RX ORDER — CLONAZEPAM 1 MG/1
0.5 TABLET ORAL 2 TIMES DAILY PRN
Qty: 30 TABLET | Refills: 0 | Status: SHIPPED | OUTPATIENT
Start: 2023-01-23

## 2023-01-23 NOTE — TELEPHONE ENCOUNTER
Last time medication was refilled 12/22/22  Quantity and # of refills 30/0  Last OV 12/28/22, VV  Next OV due on 3/28/23

## 2023-01-23 NOTE — TELEPHONE ENCOUNTER
Per pt only has 1 pill left:    clonazePAM 1 MG Oral Tab    [address] - [address] [facility], [address] [facility], [phone]

## 2023-02-21 DIAGNOSIS — F41.1 GAD (GENERALIZED ANXIETY DISORDER): ICD-10-CM

## 2023-02-21 RX ORDER — CLONAZEPAM 1 MG/1
0.5 TABLET ORAL 2 TIMES DAILY PRN
Qty: 30 TABLET | Refills: 0 | Status: SHIPPED | OUTPATIENT
Start: 2023-02-21

## 2023-02-21 NOTE — TELEPHONE ENCOUNTER
Patient needs order of medication refilled. Medication was:  clonazePAM 1 MG Oral Tab      Please send medication was:   [address], [address] [place] [address], [phone]    If any issues please call back patient at:  [phone]

## 2023-02-21 NOTE — TELEPHONE ENCOUNTER
clonazePAM 1 MG Oral Tab  Last time medication was refilled 1/23/23  Quantity and # of refills 30 tab  Last OV 12/28/22  Next OV No appt scheduled

## 2023-03-20 DIAGNOSIS — F41.1 GAD (GENERALIZED ANXIETY DISORDER): ICD-10-CM

## 2023-03-20 RX ORDER — CLONAZEPAM 1 MG/1
0.5 TABLET ORAL 2 TIMES DAILY PRN
Qty: 30 TABLET | Refills: 0 | Status: SHIPPED | OUTPATIENT
Start: 2023-03-20

## 2023-03-20 NOTE — TELEPHONE ENCOUNTER
Medication requested: ClonazePAM   Dose: 1mg tablet    Is patient requesting 30 or 90 day supply:  30     Pharmacy name/location:  [place] [address], [address] [place] [address] [phone], [phone]    Is the patient due for appointment: yes (if so, please schedule)    Additional notes:  Pt advised due for med check and currently driving. Advised to call office back to schedule or may schedule via [employer].

## 2023-03-20 NOTE — TELEPHONE ENCOUNTER
clonazePAM 1 MG Oral Tab  Last time medication was refilled 2/21/22  Quantity and # of refills 30 tab  Last OV Med f/u VV 12/28/22  Next OV no appt scheduled

## 2023-03-21 ENCOUNTER — TELEPHONE (OUTPATIENT)
Dept: INTERNAL MEDICINE CLINIC | Facility: CLINIC | Age: 53
End: 2023-03-21

## 2023-03-21 DIAGNOSIS — F41.1 GAD (GENERALIZED ANXIETY DISORDER): ICD-10-CM

## 2023-03-21 RX ORDER — CLONAZEPAM 0.5 MG/1
0.5 TABLET ORAL 2 TIMES DAILY PRN
COMMUNITY
Start: 2023-03-21

## 2023-03-21 NOTE — TELEPHONE ENCOUNTER
Spoke with Chris Dang at [facility], verbal order given to sub for 0.5 mg dosage due to 1 mg not available at pharmacy, will dispense 60 tablets of 0.5 mg tablets  Med list updated

## 2023-03-21 NOTE — TELEPHONE ENCOUNTER
Per fax received from pharmacy needs clarification on the clonazePAM    1mg or B/O-can sub 0.5mg    Please advise    Fax placed in triage

## 2023-04-13 DIAGNOSIS — F32.A DEPRESSION, UNSPECIFIED DEPRESSION TYPE: ICD-10-CM

## 2023-04-13 DIAGNOSIS — F41.1 GAD (GENERALIZED ANXIETY DISORDER): ICD-10-CM

## 2023-04-13 RX ORDER — FLUVOXAMINE MALEATE 100 MG
TABLET ORAL
Qty: 45 TABLET | Refills: 0 | Status: SHIPPED | OUTPATIENT
Start: 2023-04-13

## 2023-04-25 DIAGNOSIS — F41.1 GAD (GENERALIZED ANXIETY DISORDER): ICD-10-CM

## 2023-04-25 RX ORDER — CLONAZEPAM 0.5 MG/1
0.5 TABLET ORAL 2 TIMES DAILY PRN
Qty: 30 TABLET | Refills: 0 | Status: SHIPPED | OUTPATIENT
Start: 2023-04-25

## 2023-04-25 NOTE — TELEPHONE ENCOUNTER
Refill req     clonazePAM 0.5 MG Oral Tab    [address] [place] [address] [address] [phone], [phone]    Last OV 1/15/2020 Last video 12/28/2022  Future Appointments   Date Time Provider Dhara aWll   4/26/2023  9:30 AM TEX Powell [facility] [address]     Pt said he does not have insurance and so that is why he does the video visits for his refills. I asked about scheduling a physical and he declined.

## 2023-04-25 NOTE — TELEPHONE ENCOUNTER
Last time medication was refilled 3/20/23  Quantity and # of refills 30/0  Last OV 12/28/22  Next OV none scheduled

## 2023-05-10 DIAGNOSIS — F41.1 GAD (GENERALIZED ANXIETY DISORDER): ICD-10-CM

## 2023-05-10 RX ORDER — CLONAZEPAM 1 MG/1
0.5 TABLET ORAL 2 TIMES DAILY PRN
Qty: 30 TABLET | Refills: 0 | Status: SHIPPED | OUTPATIENT
Start: 2023-05-10

## 2023-05-10 NOTE — TELEPHONE ENCOUNTER
Medication requested: Clonazepam  Dose: 1mg    Is patient requesting 30 or 90 day supply:  30    Pharmacy name/location: [address], [address] [place] [address], [phone]     LOV:  04/26/23    Is the patient due for appointment: no (if so, please schedule)    Additional notes: pharmacy does now have the 1mg back in stock.

## 2023-05-10 NOTE — TELEPHONE ENCOUNTER
Clonazepam 0.5 mg tab  Last time medication was refilled 4/25/23 for 15 day supply  Quantity and # of refills 30 tab w no refills  Last OV VV 4/26/23  Next OV no appt scheduled

## 2023-06-04 DIAGNOSIS — F41.1 GAD (GENERALIZED ANXIETY DISORDER): ICD-10-CM

## 2023-06-04 DIAGNOSIS — F32.A DEPRESSION, UNSPECIFIED DEPRESSION TYPE: ICD-10-CM

## 2023-06-05 RX ORDER — FLUVOXAMINE MALEATE 100 MG
TABLET ORAL
Qty: 45 TABLET | Refills: 0 | Status: SHIPPED | OUTPATIENT
Start: 2023-06-05

## 2023-06-05 NOTE — TELEPHONE ENCOUNTER
Last time medication was refilled: 4/13/23  Quantity and # of refills: 45 tabs w/0 refills   Last OV telemed visit 4/26/23  Next OV No future appointments.

## 2023-06-09 DIAGNOSIS — F41.1 GAD (GENERALIZED ANXIETY DISORDER): ICD-10-CM

## 2023-06-09 RX ORDER — CLONAZEPAM 0.5 MG/1
0.5 TABLET ORAL 2 TIMES DAILY PRN
Qty: 60 TABLET | Refills: 1 | Status: SHIPPED | OUTPATIENT
Start: 2023-06-09

## 2023-06-09 NOTE — TELEPHONE ENCOUNTER
Pt typically takes Clonazepam 0.5mg BID but this dosage has been out of stock. Spoke with pharmacy and above dosage is back in stock. Pt informed of dosage change and to resume 1 tab BID. He expressed understanding. Pt also reminded of f/u due in July.   Pt does not have insurance therefore unable to be seen for physical.     Sent to Dr. Quan Key for approval.

## 2023-06-09 NOTE — TELEPHONE ENCOUNTER
Medication requested:     clonazePAM 1 MG Oral Tab    Is patient requesting 30 or 90 day supply:  30    Pharmacy name/location:  [address], [address] [place] [address], [phone]    LOV:  04/26/23    Is the patient due for appointment: yes (if so, please schedule)    Additional notes:  Pt aware due for annual physical and will contact us back to schedule an appt.

## 2023-07-03 DIAGNOSIS — F32.A DEPRESSION, UNSPECIFIED DEPRESSION TYPE: ICD-10-CM

## 2023-07-03 DIAGNOSIS — F41.1 GAD (GENERALIZED ANXIETY DISORDER): ICD-10-CM

## 2023-07-03 RX ORDER — FLUVOXAMINE MALEATE 100 MG
150 TABLET ORAL NIGHTLY
Qty: 45 TABLET | Refills: 0 | Status: SHIPPED | OUTPATIENT
Start: 2023-07-03

## 2023-08-08 DIAGNOSIS — F32.A DEPRESSION, UNSPECIFIED DEPRESSION TYPE: ICD-10-CM

## 2023-08-08 DIAGNOSIS — F41.1 GAD (GENERALIZED ANXIETY DISORDER): ICD-10-CM

## 2023-08-08 RX ORDER — FLUVOXAMINE MALEATE 100 MG
150 TABLET ORAL NIGHTLY
Qty: 45 TABLET | Refills: 0 | Status: SHIPPED | OUTPATIENT
Start: 2023-08-08

## 2023-08-08 RX ORDER — CLONAZEPAM 0.5 MG/1
0.5 TABLET ORAL 2 TIMES DAILY PRN
Qty: 60 TABLET | Refills: 0 | Status: SHIPPED | OUTPATIENT
Start: 2023-08-08

## 2023-08-30 DIAGNOSIS — F41.1 GAD (GENERALIZED ANXIETY DISORDER): ICD-10-CM

## 2023-08-30 RX ORDER — CLONAZEPAM 0.5 MG/1
0.5 TABLET ORAL 2 TIMES DAILY PRN
Qty: 60 TABLET | Refills: 0 | Status: SHIPPED | OUTPATIENT
Start: 2023-08-30

## 2023-08-31 RX ORDER — CLONAZEPAM 0.5 MG/1
0.5 TABLET ORAL 2 TIMES DAILY PRN
Qty: 60 TABLET | Refills: 0 | OUTPATIENT
Start: 2023-08-31

## 2023-09-14 DIAGNOSIS — F41.1 GAD (GENERALIZED ANXIETY DISORDER): ICD-10-CM

## 2023-09-14 DIAGNOSIS — F32.A DEPRESSION, UNSPECIFIED DEPRESSION TYPE: ICD-10-CM

## 2023-09-14 RX ORDER — FLUVOXAMINE MALEATE 100 MG
150 TABLET ORAL NIGHTLY
Qty: 45 TABLET | Refills: 0 | Status: SHIPPED | OUTPATIENT
Start: 2023-09-14

## 2023-10-02 DIAGNOSIS — F41.1 GAD (GENERALIZED ANXIETY DISORDER): ICD-10-CM

## 2023-10-02 RX ORDER — CLONAZEPAM 0.5 MG/1
0.5 TABLET ORAL 2 TIMES DAILY PRN
Qty: 60 TABLET | Refills: 0 | Status: SHIPPED | OUTPATIENT
Start: 2023-10-02

## 2023-10-02 NOTE — TELEPHONE ENCOUNTER
Last time medication was refilled 08/30/2023  Quantity and # of refills 60 w/ 0  Last OV 04/26/2023  Next OV No future appointments. Medication not on protocol.     Sent to ERICA Shultz for approval

## 2023-10-26 ENCOUNTER — TELEPHONE (OUTPATIENT)
Dept: INTERNAL MEDICINE CLINIC | Facility: CLINIC | Age: 53
End: 2023-10-26

## 2023-10-26 NOTE — TELEPHONE ENCOUNTER
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

## 2023-10-26 NOTE — TELEPHONE ENCOUNTER
[facility] per [facility] APN to send letter  Letter sent

## 2023-10-30 DIAGNOSIS — F32.A DEPRESSION, UNSPECIFIED DEPRESSION TYPE: ICD-10-CM

## 2023-10-30 DIAGNOSIS — F41.1 GAD (GENERALIZED ANXIETY DISORDER): ICD-10-CM

## 2023-10-30 RX ORDER — FLUVOXAMINE MALEATE 100 MG
150 TABLET ORAL NIGHTLY
Qty: 45 TABLET | Refills: 0 | Status: SHIPPED | OUTPATIENT
Start: 2023-10-30

## 2023-10-30 RX ORDER — CLONAZEPAM 0.5 MG/1
0.5 TABLET ORAL 2 TIMES DAILY PRN
Qty: 60 TABLET | Refills: 0 | Status: SHIPPED | OUTPATIENT
Start: 2023-10-30

## 2023-10-30 NOTE — TELEPHONE ENCOUNTER
Medication requested:     clonazePAM 0.5 MG Oral Tab     fluvoxaMINE 100 MG Oral Tab     Is patient requesting 30 or 90 day supply:  30     Pharmacy name/location:  [address], [address] [place] [address], [phone]     LOV:  04.26.23    Is the patient due for appointment: yes (if so, please schedule)    Additional notes:  pt aware due for appt and will c/b to schedule at a later date.

## 2023-10-30 NOTE — TELEPHONE ENCOUNTER
Last time medication was refilled 9/14/23  Quantity and # of refills 30 days supply  Last OV 4/26/23  Next OV none scheduled, no insurance at this time

## 2023-11-29 ENCOUNTER — TELEPHONE (OUTPATIENT)
Dept: INTERNAL MEDICINE CLINIC | Facility: CLINIC | Age: 53
End: 2023-11-29

## 2023-11-29 DIAGNOSIS — F41.1 GAD (GENERALIZED ANXIETY DISORDER): ICD-10-CM

## 2023-11-29 RX ORDER — CLONAZEPAM 0.5 MG/1
0.5 TABLET ORAL 2 TIMES DAILY PRN
Qty: 60 TABLET | Refills: 0 | Status: SHIPPED | OUTPATIENT
Start: 2023-11-29

## 2023-11-30 PROBLEM — F10.21 ALCOHOL DEPENDENCE IN REMISSION (HCC): Status: RESOLVED | Noted: 2017-03-28 | Resolved: 2023-11-30

## 2023-11-30 PROBLEM — F10.20 UNCOMPLICATED ALCOHOL DEPENDENCE (HCC): Status: ACTIVE | Noted: 2019-10-20

## 2024-01-03 DIAGNOSIS — F41.1 GAD (GENERALIZED ANXIETY DISORDER): ICD-10-CM

## 2024-01-03 RX ORDER — CLONAZEPAM 0.5 MG/1
0.5 TABLET ORAL 2 TIMES DAILY PRN
Qty: 60 TABLET | Refills: 2 | Status: SHIPPED | OUTPATIENT
Start: 2024-01-03

## 2024-01-03 NOTE — TELEPHONE ENCOUNTER
Last time medication was refilled 11/2923  Quantity and # of refills 60 w 0  Last OV 11/30/23  Next OV No future appts scheduled.     Sent to ERICA Shultz for approval.

## 2024-04-01 DIAGNOSIS — F41.1 GAD (GENERALIZED ANXIETY DISORDER): ICD-10-CM

## 2024-04-01 RX ORDER — CLONAZEPAM 0.5 MG/1
0.5 TABLET ORAL 2 TIMES DAILY PRN
Qty: 60 TABLET | Refills: 0 | Status: SHIPPED | OUTPATIENT
Start: 2024-04-01

## 2024-04-01 NOTE — TELEPHONE ENCOUNTER
Last time medication was refilled 01/03/2024  Last OV 04/26/20223  Next OV due/scheduled No future appointments.

## 2024-04-09 DIAGNOSIS — F32.A DEPRESSION, UNSPECIFIED DEPRESSION TYPE: ICD-10-CM

## 2024-04-09 DIAGNOSIS — F41.1 GAD (GENERALIZED ANXIETY DISORDER): ICD-10-CM

## 2024-04-09 RX ORDER — FLUVOXAMINE MALEATE 100 MG
150 TABLET ORAL NIGHTLY
Qty: 45 TABLET | Refills: 0 | Status: SHIPPED | OUTPATIENT
Start: 2024-04-09

## 2024-04-09 NOTE — TELEPHONE ENCOUNTER
Last time medication was refilled 11/30/2023  Last OV 11/30/2023  Next OV due/scheduled No future appointments.    Sent to TEX Thompson for approval.

## 2024-05-08 DIAGNOSIS — F32.A DEPRESSION, UNSPECIFIED DEPRESSION TYPE: ICD-10-CM

## 2024-05-08 DIAGNOSIS — F41.1 GAD (GENERALIZED ANXIETY DISORDER): ICD-10-CM

## 2024-05-09 RX ORDER — CLONAZEPAM 0.5 MG/1
0.5 TABLET ORAL 2 TIMES DAILY PRN
Qty: 60 TABLET | Refills: 0 | Status: SHIPPED | OUTPATIENT
Start: 2024-05-09

## 2024-05-09 RX ORDER — FLUVOXAMINE MALEATE 100 MG
150 TABLET ORAL NIGHTLY
Qty: 45 TABLET | Refills: 0 | Status: SHIPPED | OUTPATIENT
Start: 2024-05-09

## 2024-05-09 NOTE — TELEPHONE ENCOUNTER
Last time medication was refilled 04/01/2024  Last OV 04/26/2023  Next OV due/scheduled No future appointments.    Medication not on protocol.

## 2024-05-09 NOTE — TELEPHONE ENCOUNTER
Last time medication was refilled 04/09/2024  Last OV 04/26/2023  Next OV due/scheduled No future appointments.    Medication failed protocol.

## 2024-06-10 DIAGNOSIS — F41.1 GAD (GENERALIZED ANXIETY DISORDER): ICD-10-CM

## 2024-06-10 RX ORDER — CLONAZEPAM 0.5 MG/1
0.5 TABLET ORAL 2 TIMES DAILY PRN
Qty: 60 TABLET | Refills: 0 | Status: SHIPPED | OUTPATIENT
Start: 2024-06-10

## 2024-06-10 NOTE — TELEPHONE ENCOUNTER
Last time medication was refilled 05/09/2024  Last office visit  11/30/2023  Next office visit due/scheduled No future appointments.    Sent to Doctor Alatorre for approval

## 2024-06-13 DIAGNOSIS — F32.A DEPRESSION, UNSPECIFIED DEPRESSION TYPE: ICD-10-CM

## 2024-06-13 DIAGNOSIS — F41.1 GAD (GENERALIZED ANXIETY DISORDER): ICD-10-CM

## 2024-06-14 RX ORDER — FLUVOXAMINE MALEATE 100 MG
150 TABLET ORAL NIGHTLY
Qty: 45 TABLET | Refills: 0 | Status: SHIPPED | OUTPATIENT
Start: 2024-06-14

## 2024-07-08 DIAGNOSIS — F41.1 GAD (GENERALIZED ANXIETY DISORDER): ICD-10-CM

## 2024-07-08 RX ORDER — CLONAZEPAM 0.5 MG/1
0.5 TABLET ORAL 2 TIMES DAILY PRN
Qty: 60 TABLET | Refills: 0 | Status: SHIPPED | OUTPATIENT
Start: 2024-07-08

## 2024-07-08 NOTE — TELEPHONE ENCOUNTER
Last time medication was refilled 6/10/24  Last office visit  VV 11/30/23  Next office visit due/scheduled Over due for f/u as of February 2024. Front office PSR made aware to schedule f/u VV. Routing refill to provider.

## 2024-07-08 NOTE — TELEPHONE ENCOUNTER
Medication requested: CLONAZEPAM 0.5 MG Oral Tab [[id number]] (Order [id number])     Dose: 0.5    Is patient requesting 30 or 90 day supply:  60    Pharmacy name/location:    [id number] - [facility] [address] [phone], [phone]   [address] [facility] [address]   Phone: [phone] Fax: [phone]       LOV:  4/2023    Is the patient due for appointment: YES (if so, please schedule)    Additional notes:  NA

## 2024-07-19 DIAGNOSIS — F32.A DEPRESSION, UNSPECIFIED DEPRESSION TYPE: ICD-10-CM

## 2024-07-19 DIAGNOSIS — F41.1 GAD (GENERALIZED ANXIETY DISORDER): ICD-10-CM

## 2024-07-19 RX ORDER — FLUVOXAMINE MALEATE 100 MG
150 TABLET ORAL NIGHTLY
Qty: 45 TABLET | Refills: 0 | Status: SHIPPED | OUTPATIENT
Start: 2024-07-19

## 2024-07-19 NOTE — TELEPHONE ENCOUNTER
Last time medication was refilled: 6/24/24  Next office visit due/scheduled: no apt  Last office visit: 11/30/23  Last Labs: 1/21/20

## 2024-08-14 DIAGNOSIS — F41.1 GAD (GENERALIZED ANXIETY DISORDER): ICD-10-CM

## 2024-08-14 RX ORDER — CLONAZEPAM 0.5 MG/1
0.5 TABLET ORAL 2 TIMES DAILY PRN
Qty: 60 TABLET | Refills: 0 | Status: SHIPPED | OUTPATIENT
Start: 2024-08-14

## 2024-08-14 NOTE — TELEPHONE ENCOUNTER
Last time medication was refilled 7/10/23  Last office visit  Video visit 11/30/23  Next office visit due/scheduled 8/20/24 Video visit     Last labs 1/21/20. Last in office visit 1/15/2020. Routing to provider for review.

## 2024-08-14 NOTE — TELEPHONE ENCOUNTER
Medication requested:   clonazePAM 0.5 MG Oral Tab [[id number]] (Order [id number])     Dose: SEE SCRIPT     Is patient requesting 30 or 90 day supply:  30    Pharmacy name/location:    [id number] - [facility] [address] [phone], [phone]   [address] [facility] [address]   Phone: [phone] Fax: [phone]       LOV: SOPHIE IRAHETA     Is the patient due for appointment: MED CHECK AUG. 20TH (if so, please schedule)    Additional notes:  NO

## 2024-08-22 DIAGNOSIS — F32.A DEPRESSION, UNSPECIFIED DEPRESSION TYPE: ICD-10-CM

## 2024-08-22 DIAGNOSIS — F41.1 GAD (GENERALIZED ANXIETY DISORDER): ICD-10-CM

## 2024-08-22 RX ORDER — FLUVOXAMINE MALEATE 100 MG
TABLET ORAL
Qty: 45 TABLET | Refills: 0 | Status: SHIPPED | OUTPATIENT
Start: 2024-08-22 | End: 2024-08-26 | Stop reason: DRUGHIGH

## 2024-08-22 NOTE — TELEPHONE ENCOUNTER
Last time medication was refilled 07/19/2024  Last office visit  04/26/2023  Next office visit due/scheduled   Future Appointments   Date Time Provider Department Center   8/26/2024  2:30 PM Lexii Youngblood APRN [facility] [address]     Medication not on protocol, routed to Doctor Alatorre.

## 2024-10-27 DIAGNOSIS — F41.1 GAD (GENERALIZED ANXIETY DISORDER): ICD-10-CM

## 2024-10-27 DIAGNOSIS — F10.20 UNCOMPLICATED ALCOHOL DEPENDENCE (HCC): ICD-10-CM

## 2024-10-28 RX ORDER — CLONAZEPAM 1 MG/1
1 TABLET ORAL 2 TIMES DAILY PRN
Qty: 60 TABLET | Refills: 0 | Status: SHIPPED | OUTPATIENT
Start: 2024-10-28

## 2024-10-28 NOTE — TELEPHONE ENCOUNTER
Last time medication was refilled 09/13/2024  Last office visit  08/26/2024  Next office visit due/scheduled No future appointments..      Medication not on protocol.

## 2024-12-13 DIAGNOSIS — F41.1 GAD (GENERALIZED ANXIETY DISORDER): ICD-10-CM

## 2024-12-13 DIAGNOSIS — F10.20 UNCOMPLICATED ALCOHOL DEPENDENCE (HCC): ICD-10-CM

## 2024-12-13 RX ORDER — CLONAZEPAM 1 MG/1
1 TABLET ORAL 2 TIMES DAILY PRN
Qty: 60 TABLET | Refills: 0 | Status: SHIPPED | OUTPATIENT
Start: 2024-12-13

## 2024-12-13 NOTE — TELEPHONE ENCOUNTER
Last time medication was refilled 10/28/24  Last office visit  8/26/24  Next office visit due/scheduled No future appointments.  Medication not on protocol.

## 2025-01-15 DIAGNOSIS — F41.1 GAD (GENERALIZED ANXIETY DISORDER): ICD-10-CM

## 2025-01-15 RX ORDER — CLONAZEPAM 0.5 MG/1
0.5 TABLET ORAL 2 TIMES DAILY PRN
Qty: 15 TABLET | Refills: 0 | Status: SHIPPED | OUTPATIENT
Start: 2025-01-15

## 2025-01-15 NOTE — TELEPHONE ENCOUNTER
Medication requested: CLONAZEPAM 1 MG Oral Tab     Is patient requesting 30 or 90 day supply:  90    Pharmacy name/location:    [id number] - [place], [place] - [address] [phone], [phone] [address] [facility] [address]   Phone: [phone] Fax: [phone]   Hours: Not open 24 hours       LOV:  08/26/2024    Is the patient due for appointment: appt made  (if so, please schedule)    Additional notes:  out of meds

## 2025-01-15 NOTE — TELEPHONE ENCOUNTER
Routing 15 tab supply to ERICA Sanchez for review and approval.    Upcoming appt 1/22/25. Last in office visit was 1/15/2020. Video visit 8/2024.

## 2025-01-29 ENCOUNTER — TELEPHONE (OUTPATIENT)
Dept: INTERNAL MEDICINE CLINIC | Facility: CLINIC | Age: 55
End: 2025-01-29

## 2025-01-29 DIAGNOSIS — F41.1 GAD (GENERALIZED ANXIETY DISORDER): ICD-10-CM

## 2025-01-29 RX ORDER — CLONAZEPAM 0.5 MG/1
0.5 TABLET ORAL 2 TIMES DAILY PRN
Qty: 15 TABLET | Refills: 0 | Status: SHIPPED | OUTPATIENT
Start: 2025-01-29

## 2025-01-29 NOTE — TELEPHONE ENCOUNTER
Medication requested: clonazePAM 0.5 MG Oral Tab     Is patient requesting 30 or 90 day supply:  30    Pharmacy name/location:  [id number] - [address] [phone], [phone]     LOV:  01/15/2020    Is the patient due for appointment: yes - Patient states that he needs he's meds right now and after he get his meds he will call back to do an appt.     Additional notes:  patient states that he does not have INS

## 2025-01-29 NOTE — TELEPHONE ENCOUNTER
Discussed with ERICA Sanchez. Although patient does not have insurance his last physical in office visit was in 2021. Per Coby wiseman to send 15 tab supply. Schedule video visit with Dr. Alatorre.    Azalia message sent to patient informing of above. Clinical note sent to clinical team as well.

## 2025-06-05 DIAGNOSIS — F33.1 MODERATE EPISODE OF RECURRENT MAJOR DEPRESSIVE DISORDER (HCC): ICD-10-CM

## 2025-06-05 DIAGNOSIS — F41.1 GAD (GENERALIZED ANXIETY DISORDER): ICD-10-CM

## 2025-06-05 NOTE — TELEPHONE ENCOUNTER
Last time medication was refilled 08/26/2024  Last office visit  08/26/2024  Next office visit due/scheduled No future appointments.        Medication not on protocol.

## 2025-06-06 RX ORDER — FLUVOXAMINE MALEATE 100 MG
200 TABLET ORAL NIGHTLY
Qty: 180 TABLET | Refills: 0 | Status: SHIPPED | OUTPATIENT
Start: 2025-06-06 | End: 2025-09-04

## (undated) DIAGNOSIS — F41.1 GAD (GENERALIZED ANXIETY DISORDER): ICD-10-CM

## (undated) NOTE — LETTER
Date: 4/10/2020    Patient Name: Haley Foley   11/5/1970    Claim # [id number]          To Whom it may concern: The above patient is currently under my medical care. Mr. Haley Gold has been diagnosed and treated for anxiety and addiction.  His previous job req

## (undated) NOTE — MR AVS SNAPSHOT
[address]  [address] [facility] [id number] [phone]               Thank you for choosing us for your health care visit with Santiago Vela PA-C.   We are glad to serve you and happy to provide you with Current Medications          This list is accurate as of: 3/28/17 10:49 AM.  Always use your most recent med list.                BusPIRone HCl 15 MG Tabs   Take 1 tablet (15 mg total) by mouth 2 (two) times daily.    What changed:    - medication str

## (undated) NOTE — MR AVS SNAPSHOT
[address]  [address] [id number] [phone]               Thank you for choosing us for your health care visit with Narayan Quinonez PA-C.   We are glad to serve you and happy to provide you with Substance Abuse and Mental Health Services Administration at www.samhsa.gov/treatment. [employer] ([employer])  AA helps members get sober and stay sober. They help you build healthy patterns of living. Everyone is welcome at an [address]  You do not h Take 1 tablet (1 mg total) by mouth 2 (two) times daily as needed for Anxiety. Commonly known as:  KLONOPIN           DULoxetine HCl 60 MG Cpep   Take 1 capsule (60 mg total) by mouth daily.    Commonly known as:  CYMBALTA                Where to Get Your